# Patient Record
Sex: MALE | Race: WHITE | NOT HISPANIC OR LATINO | URBAN - METROPOLITAN AREA
[De-identification: names, ages, dates, MRNs, and addresses within clinical notes are randomized per-mention and may not be internally consistent; named-entity substitution may affect disease eponyms.]

---

## 2020-11-09 ENCOUNTER — HOSPITAL ENCOUNTER (EMERGENCY)
Facility: HOSPITAL | Age: 60
Discharge: HOME | End: 2020-11-09
Attending: EMERGENCY MEDICINE | Admitting: EMERGENCY MEDICINE

## 2020-11-09 ENCOUNTER — APPOINTMENT (OUTPATIENT)
Dept: GENERAL RADIOLOGY | Facility: HOSPITAL | Age: 60
End: 2020-11-09

## 2020-11-09 VITALS
DIASTOLIC BLOOD PRESSURE: 95 MMHG | HEIGHT: 68 IN | SYSTOLIC BLOOD PRESSURE: 123 MMHG | BODY MASS INDEX: 40.16 KG/M2 | OXYGEN SATURATION: 95 % | TEMPERATURE: 97.9 F | WEIGHT: 265 LBS | RESPIRATION RATE: 18 BRPM | HEART RATE: 92 BPM

## 2020-11-09 DIAGNOSIS — I48.91 NEW ONSET ATRIAL FIBRILLATION (HCC): Primary | ICD-10-CM

## 2020-11-09 LAB
ALBUMIN SERPL-MCNC: 4.4 G/DL (ref 3.5–5.2)
ALBUMIN/GLOB SERPL: 1.8 G/DL
ALP SERPL-CCNC: 87 U/L (ref 39–117)
ALT SERPL W P-5'-P-CCNC: 27 U/L (ref 1–41)
ANION GAP SERPL CALCULATED.3IONS-SCNC: 7.7 MMOL/L (ref 5–15)
AST SERPL-CCNC: 23 U/L (ref 1–40)
BASOPHILS # BLD AUTO: 0.11 10*3/MM3 (ref 0–0.2)
BASOPHILS NFR BLD AUTO: 0.7 % (ref 0–1.5)
BILIRUB SERPL-MCNC: 0.4 MG/DL (ref 0–1.2)
BILIRUB UR QL STRIP: NEGATIVE
BUN SERPL-MCNC: 19 MG/DL (ref 6–20)
BUN/CREAT SERPL: 22.6 (ref 7–25)
CALCIUM SPEC-SCNC: 9.1 MG/DL (ref 8.6–10.5)
CHLORIDE SERPL-SCNC: 103 MMOL/L (ref 98–107)
CLARITY UR: CLEAR
CO2 SERPL-SCNC: 25.3 MMOL/L (ref 22–29)
COLOR UR: YELLOW
CREAT SERPL-MCNC: 0.84 MG/DL (ref 0.76–1.27)
DEPRECATED RDW RBC AUTO: 39.7 FL (ref 37–54)
EOSINOPHIL # BLD AUTO: 0.22 10*3/MM3 (ref 0–0.4)
EOSINOPHIL NFR BLD AUTO: 1.5 % (ref 0.3–6.2)
ERYTHROCYTE [DISTWIDTH] IN BLOOD BY AUTOMATED COUNT: 12.4 % (ref 12.3–15.4)
GFR SERPL CREATININE-BSD FRML MDRD: 113 ML/MIN/1.73
GFR SERPL CREATININE-BSD FRML MDRD: 94 ML/MIN/1.73
GLOBULIN UR ELPH-MCNC: 2.4 GM/DL
GLUCOSE SERPL-MCNC: 114 MG/DL (ref 65–99)
GLUCOSE UR STRIP-MCNC: NEGATIVE MG/DL
HCT VFR BLD AUTO: 49.3 % (ref 37.5–51)
HGB BLD-MCNC: 17.7 G/DL (ref 13–17.7)
HGB UR QL STRIP.AUTO: NEGATIVE
HOLD SPECIMEN: NORMAL
HOLD SPECIMEN: NORMAL
IMM GRANULOCYTES # BLD AUTO: 0.17 10*3/MM3 (ref 0–0.05)
IMM GRANULOCYTES NFR BLD AUTO: 1.1 % (ref 0–0.5)
INR PPP: 0.91 (ref 0.9–1.1)
KETONES UR QL STRIP: NEGATIVE
LEUKOCYTE ESTERASE UR QL STRIP.AUTO: NEGATIVE
LIPASE SERPL-CCNC: 18 U/L (ref 13–60)
LYMPHOCYTES # BLD AUTO: 2.58 10*3/MM3 (ref 0.7–3.1)
LYMPHOCYTES NFR BLD AUTO: 17.1 % (ref 19.6–45.3)
MCH RBC QN AUTO: 31.6 PG (ref 26.6–33)
MCHC RBC AUTO-ENTMCNC: 35.9 G/DL (ref 31.5–35.7)
MCV RBC AUTO: 88 FL (ref 79–97)
MONOCYTES # BLD AUTO: 1.14 10*3/MM3 (ref 0.1–0.9)
MONOCYTES NFR BLD AUTO: 7.6 % (ref 5–12)
NEUTROPHILS NFR BLD AUTO: 10.83 10*3/MM3 (ref 1.7–7)
NEUTROPHILS NFR BLD AUTO: 72 % (ref 42.7–76)
NITRITE UR QL STRIP: NEGATIVE
NRBC BLD AUTO-RTO: 0 /100 WBC (ref 0–0.2)
NT-PROBNP SERPL-MCNC: 49.5 PG/ML (ref 0–900)
PH UR STRIP.AUTO: 6 [PH] (ref 5–8)
PLATELET # BLD AUTO: 225 10*3/MM3 (ref 140–450)
PMV BLD AUTO: 9.4 FL (ref 6–12)
POTASSIUM SERPL-SCNC: 3.8 MMOL/L (ref 3.5–5.2)
PROT SERPL-MCNC: 6.8 G/DL (ref 6–8.5)
PROT UR QL STRIP: NEGATIVE
PROTHROMBIN TIME: 12.2 SECONDS (ref 11.7–14.2)
QT INTERVAL: 288 MS
RBC # BLD AUTO: 5.6 10*6/MM3 (ref 4.14–5.8)
SODIUM SERPL-SCNC: 136 MMOL/L (ref 136–145)
SP GR UR STRIP: 1.01 (ref 1–1.03)
TROPONIN T SERPL-MCNC: <0.01 NG/ML (ref 0–0.03)
UROBILINOGEN UR QL STRIP: NORMAL
WBC # BLD AUTO: 15.05 10*3/MM3 (ref 3.4–10.8)
WHOLE BLOOD HOLD SPECIMEN: NORMAL
WHOLE BLOOD HOLD SPECIMEN: NORMAL

## 2020-11-09 PROCEDURE — 99284 EMERGENCY DEPT VISIT MOD MDM: CPT

## 2020-11-09 PROCEDURE — 85025 COMPLETE CBC W/AUTO DIFF WBC: CPT | Performed by: NURSE PRACTITIONER

## 2020-11-09 PROCEDURE — 96374 THER/PROPH/DIAG INJ IV PUSH: CPT

## 2020-11-09 PROCEDURE — 83880 ASSAY OF NATRIURETIC PEPTIDE: CPT | Performed by: NURSE PRACTITIONER

## 2020-11-09 PROCEDURE — 81003 URINALYSIS AUTO W/O SCOPE: CPT | Performed by: NURSE PRACTITIONER

## 2020-11-09 PROCEDURE — 96376 TX/PRO/DX INJ SAME DRUG ADON: CPT

## 2020-11-09 PROCEDURE — 93005 ELECTROCARDIOGRAM TRACING: CPT | Performed by: EMERGENCY MEDICINE

## 2020-11-09 PROCEDURE — 93010 ELECTROCARDIOGRAM REPORT: CPT | Performed by: INTERNAL MEDICINE

## 2020-11-09 PROCEDURE — 84484 ASSAY OF TROPONIN QUANT: CPT | Performed by: NURSE PRACTITIONER

## 2020-11-09 PROCEDURE — 85610 PROTHROMBIN TIME: CPT | Performed by: NURSE PRACTITIONER

## 2020-11-09 PROCEDURE — 71045 X-RAY EXAM CHEST 1 VIEW: CPT

## 2020-11-09 PROCEDURE — 93005 ELECTROCARDIOGRAM TRACING: CPT

## 2020-11-09 PROCEDURE — 80053 COMPREHEN METABOLIC PANEL: CPT | Performed by: NURSE PRACTITIONER

## 2020-11-09 PROCEDURE — 93005 ELECTROCARDIOGRAM TRACING: CPT | Performed by: NURSE PRACTITIONER

## 2020-11-09 PROCEDURE — 83690 ASSAY OF LIPASE: CPT | Performed by: NURSE PRACTITIONER

## 2020-11-09 RX ORDER — SODIUM CHLORIDE 0.9 % (FLUSH) 0.9 %
10 SYRINGE (ML) INJECTION AS NEEDED
Status: DISCONTINUED | OUTPATIENT
Start: 2020-11-09 | End: 2020-11-10 | Stop reason: HOSPADM

## 2020-11-09 RX ADMIN — METOPROLOL TARTRATE 25 MG: 25 TABLET, FILM COATED ORAL at 21:38

## 2020-11-09 RX ADMIN — SODIUM CHLORIDE 500 ML: 9 INJECTION, SOLUTION INTRAVENOUS at 18:58

## 2020-11-09 RX ADMIN — METOROPROLOL TARTRATE 5 MG: 5 INJECTION, SOLUTION INTRAVENOUS at 19:01

## 2020-11-09 RX ADMIN — METOROPROLOL TARTRATE 5 MG: 5 INJECTION, SOLUTION INTRAVENOUS at 19:48

## 2020-11-09 RX ADMIN — APIXABAN 5 MG: 5 TABLET, FILM COATED ORAL at 21:38

## 2020-11-09 RX ADMIN — METOROPROLOL TARTRATE 5 MG: 5 INJECTION, SOLUTION INTRAVENOUS at 19:39

## 2020-11-09 NOTE — ED NOTES
"Pt reports palpitations. Wife did \"a 1 lead EKG on apple watch which showed afib with RVR.\" Pt denies chest pain or SOB. No previous history.      Josie Rodríguez, RN  11/09/20 6529    "

## 2020-11-10 LAB — QT INTERVAL: 346 MS

## 2020-11-10 NOTE — ED PROVIDER NOTES
I have supervised the care provided by the midlevel provider.    We have discussed this patient's history, physical exam, and treatment plan.   I have reviewed the note and have personally examined the patient and agree with the plan of care.  See attached attending note.  My personal findings are below:    Patient complains of palpitations and rapid heart rate that began this afternoon.  Denies chest pain, dizziness, shortness of breath, or syncope.  Nothing makes it better or worse.    On exam: Awake, alert.  Heart is irregularly irregular.  Rate is normal.  Lungs are clear bilaterally.  Abdomen is soft and nontender.  No calf tenderness.  No pedal edema.    I reviewed the patient's EKG and agree with the interpretation done by MASSIMO Hernandez.  EKG shows A. fib with RVR.  Patient denies any history of atrial fibrillation.  Labs are unremarkable except for white blood cell count of 15.  Chest x-ray shows mild cardiomegaly.  Patient has been given IV Lopressor.  Plan is to consult cardiology.     Harlan Gil MD  11/09/20 1952

## 2020-11-10 NOTE — ED NOTES
2nd dose of Lopressor IV given @1939  Afib on monitor      Kayla Casas RN  11/09/20 1940       Kayla Casas RN  11/09/20 1941

## 2020-11-10 NOTE — DISCHARGE INSTRUCTIONS
Although you are being discharged from the ED today, I encourage you to return for worsening symptoms. Things can, and do, change such that treatment at home with medication may not be adequate. Specifically I recommend returning for chest pain or discomfort, difficulty breathing, persistent vomiting or difficulty holding down liquids or medications, fever > 102.0 F, or any other worsening or alarming symptoms.     Rest. Drink plenty of fluids.  Follow up with PCP or provider listed for further evaluation and management.  Follow up with primary care provider for further management and to have blood pressure rechecked.  Take all medications as prescribed.

## 2020-11-12 ENCOUNTER — OFFICE VISIT (OUTPATIENT)
Dept: CARDIOLOGY | Facility: CLINIC | Age: 60
End: 2020-11-12

## 2020-11-12 VITALS
HEIGHT: 68 IN | WEIGHT: 268 LBS | BODY MASS INDEX: 40.62 KG/M2 | DIASTOLIC BLOOD PRESSURE: 78 MMHG | HEART RATE: 69 BPM | SYSTOLIC BLOOD PRESSURE: 132 MMHG

## 2020-11-12 DIAGNOSIS — R06.81 APNEA: ICD-10-CM

## 2020-11-12 DIAGNOSIS — I10 ESSENTIAL HYPERTENSION: ICD-10-CM

## 2020-11-12 DIAGNOSIS — I48.0 PAROXYSMAL ATRIAL FIBRILLATION (HCC): Primary | ICD-10-CM

## 2020-11-12 DIAGNOSIS — R07.89 CHEST DISCOMFORT: ICD-10-CM

## 2020-11-12 DIAGNOSIS — R06.83 SNORING: ICD-10-CM

## 2020-11-12 DIAGNOSIS — R60.9 EDEMA, UNSPECIFIED TYPE: ICD-10-CM

## 2020-11-12 PROCEDURE — 93000 ELECTROCARDIOGRAM COMPLETE: CPT | Performed by: INTERNAL MEDICINE

## 2020-11-12 PROCEDURE — 99204 OFFICE O/P NEW MOD 45 MIN: CPT | Performed by: INTERNAL MEDICINE

## 2020-11-12 RX ORDER — METOPROLOL TARTRATE 50 MG/1
50 TABLET, FILM COATED ORAL 2 TIMES DAILY
Qty: 180 TABLET | Refills: 3 | Status: SHIPPED | OUTPATIENT
Start: 2020-11-12 | End: 2020-11-12 | Stop reason: SDUPTHER

## 2020-11-12 RX ORDER — INDOMETHACIN 50 MG/1
50 CAPSULE ORAL 3 TIMES DAILY
COMMUNITY
Start: 2020-11-09 | End: 2020-11-12

## 2020-11-12 RX ORDER — FAMOTIDINE 40 MG/1
40 TABLET, FILM COATED ORAL DAILY PRN
COMMUNITY
Start: 2020-11-09

## 2020-11-12 RX ORDER — AMLODIPINE BESYLATE AND BENAZEPRIL HYDROCHLORIDE 10; 20 MG/1; MG/1
1 CAPSULE ORAL DAILY
COMMUNITY
Start: 2020-09-30 | End: 2020-11-25

## 2020-11-12 RX ORDER — METAXALONE 800 MG/1
800 TABLET ORAL
COMMUNITY
Start: 2020-09-08

## 2020-11-12 RX ORDER — METOPROLOL TARTRATE 50 MG/1
50 TABLET, FILM COATED ORAL 2 TIMES DAILY
Qty: 60 TABLET | Refills: 6 | Status: SHIPPED | OUTPATIENT
Start: 2020-11-12 | End: 2020-11-12 | Stop reason: SDUPTHER

## 2020-11-12 RX ORDER — CELECOXIB 200 MG/1
200 CAPSULE ORAL AS NEEDED
COMMUNITY
End: 2020-11-12

## 2020-11-12 RX ORDER — COLCHICINE 0.6 MG/1
1.2 TABLET ORAL AS NEEDED
COMMUNITY
Start: 2020-05-11 | End: 2021-05-11

## 2020-11-12 RX ORDER — HYDROCORTISONE BUTYRATE 1 MG/G
OINTMENT TOPICAL 2 TIMES DAILY PRN
COMMUNITY

## 2020-11-12 RX ORDER — FLUTICASONE PROPIONATE 50 MCG
1 SPRAY, SUSPENSION (ML) NASAL DAILY
COMMUNITY

## 2020-11-12 RX ORDER — HYDROCHLOROTHIAZIDE 12.5 MG/1
12.5 CAPSULE, GELATIN COATED ORAL DAILY
COMMUNITY
Start: 2020-10-13 | End: 2022-04-25

## 2020-11-12 RX ORDER — MONTELUKAST SODIUM 10 MG/1
10 TABLET ORAL DAILY
COMMUNITY
Start: 2020-09-08

## 2020-11-12 RX ORDER — DIPHENHYDRAMINE HCL 25 MG
25 CAPSULE ORAL DAILY
COMMUNITY

## 2020-11-12 RX ORDER — CHLORPHENIRAMINE MALEATE 4 MG/1
4 TABLET ORAL EVERY 6 HOURS PRN
COMMUNITY

## 2020-11-12 RX ORDER — METOPROLOL TARTRATE 50 MG/1
50 TABLET, FILM COATED ORAL 2 TIMES DAILY
Qty: 180 TABLET | Refills: 3 | Status: SHIPPED | OUTPATIENT
Start: 2020-11-12 | End: 2020-11-25 | Stop reason: SDUPTHER

## 2020-11-12 RX ORDER — OMEGA-3-ACID ETHYL ESTERS 1 G/1
CAPSULE, LIQUID FILLED ORAL
COMMUNITY
Start: 2020-09-05

## 2020-11-12 RX ORDER — VARDENAFIL HYDROCHLORIDE 20 MG/1
20 TABLET ORAL DAILY PRN
COMMUNITY

## 2020-11-12 NOTE — PROGRESS NOTES
Date of Office Visit: 2020  Encounter Provider: Ange Hernandez MD  Place of Service: [facility] CARDIOLOGY  Patient Name: Danyel Robertson  :1960    Chief complaint  Consult quested by the [address] [place] [address] emergency room for evaluation of atrial fibrillation    History of Present Illness  Patient is a 60-year-old M with history of hypertension, GFX disease and seasonal allergies.  He presented to [facility] on  (2 days ago (with rapid heartbeat palpitations that started around 1:30 in the afternoon.  Had an associated fluttering and anxiety in his chest.  His wife's apple watch indicated atrial fibrillation.  Apparently he has been having intermittent episodes for a year that may last for several hours in duration.  It is precipitated with anxiety and he has had significant stresses with political issues of late.  Emergency room EKG showed atrial fibrillation with rates of 130 that decreased to the 80s with IV Lopressor chest x-ray was unremarkable.  Blood work unremarkable.  Oral beta-blocker therapy and Eliquis were added.  Troponin was negative, proBNP TSH was normal.    He consumes 2 alcoholic beverages a day.  He was at night he believes he has apneic episodes.  He is fairly limited in his activities by knee pain.  He had been using Celebrex intermittently and Indocin daily.  He has been instructed to stop there.    Past Medical History:   Diagnosis Date   • Arthritis    • GERD (gastroesophageal reflux disease)    • Hypertension    • New onset atrial fibrillation (CMS/HCC) 2020   • Seasonal allergies      Past Surgical History:   Procedure Laterality Date   • KNEE ARTHROSCOPY W/ MENISCAL REPAIR Left 10/2011   • WISDOM TOOTH EXTRACTION       Outpatient Medications Prior to Visit   Medication Sig Dispense Refill   • amLODIPine-benazepril (LOTREL) 10-20 MG per capsule Take 1 capsule by mouth Daily.     • chlorpheniramine (CHLOR-TRIMETON) 4 MG tablet Take  4 mg by mouth Every 6 (Six) Hours As Needed for Allergies.     • colchicine 0.6 MG tablet Take 1.2 mg by mouth As Needed.     • diphenhydrAMINE (BENADRYL) 25 mg capsule Take 25 mg by mouth Daily.     • famotidine (PEPCID) 40 MG tablet Take 40 mg by mouth Daily.     • fluticasone (FLONASE) 50 MCG/ACT nasal spray 1 spray into the nostril(s) as directed by provider Daily.     • GUAIFENESIN PO Take  by mouth.     • hydroCHLOROthiazide (MICROZIDE) 12.5 MG capsule Take 12.5 mg by mouth Daily.     • hydrocortisone butyrate (LOCOID) 0.1 % ointment ointment Apply  topically to the appropriate area as directed Every 12 (Twelve) Hours.     • metaxalone (SKELAXIN) 800 MG tablet Take 800 mg by mouth 3 (Three) Times a Day.     • montelukast (SINGULAIR) 10 MG tablet Take 10 mg by mouth Daily.     • omega-3 acid ethyl esters (LOVAZA) 1 g capsule TAKE 2 CAPSULES BY MOUTH 2 (TWO) TIMES DAILY.     • vardenafil (LEVITRA) 20 MG tablet Take 20 mg by mouth Daily As Needed for Erectile Dysfunction.     • apixaban (ELIQUIS) 5 MG tablet tablet Take 1 tablet by mouth 2 (Two) Times a Day. 60 tablet 0   • celecoxib (CeleBREX) 200 MG capsule Take 200 mg by mouth As Needed for Mild Pain .     • indomethacin (INDOCIN) 50 MG capsule Take 50 mg by mouth 3 (Three) Times a Day.     • metoprolol tartrate (LOPRESSOR) 25 MG tablet Take 1 tablet by mouth 2 (Two) Times a Day. 30 tablet 0   • Omega-3 Fatty Acids (OMEGA 3 PO) Take  by mouth.       No facility-administered medications prior to visit.        Allergies as of 11/12/2020 - Reviewed 11/12/2020   Allergen Reaction Noted   • Procaine Itching and Swelling 09/30/2014   • Oxycodone Rash 09/30/2014   • Penicillins Rash 09/30/2014     Social History     Socioeconomic History   • Marital status:      Spouse name: Not on file   • Number of children: Not on file   • Years of education: Not on file   • Highest education level: Not on file   Tobacco Use   • Smoking status: Never Smoker   • Smokeless  "tobacco: Never Used   Substance and Sexual Activity   • Alcohol use: Yes     Comment: social   • Drug use: Never   • Sexual activity: Yes     Partners: Female     Family History   Problem Relation Age of Onset   • Heart disease Mother         VSD   • Other Father         shy drager syndrome   • Diabetes Sister      Review of Systems   Constitution: Negative for fever, malaise/fatigue, weight gain and weight loss.   HENT: Negative for ear pain, hearing loss, nosebleeds and sore throat.    Eyes: Negative for double vision, pain, vision loss in left eye and vision loss in right eye.   Cardiovascular: Positive for leg swelling and palpitations.        See history of present illness.   Respiratory: Positive for shortness of breath and snoring. Negative for cough, sleep disturbances due to breathing and wheezing.    Endocrine: Negative for cold intolerance, heat intolerance and polyuria.   Skin: Negative for itching, poor wound healing and rash.   Musculoskeletal: Positive for joint pain and joint swelling. Negative for myalgias.   Gastrointestinal: Negative for abdominal pain, diarrhea, hematochezia, nausea and vomiting.   Genitourinary: Negative for hematuria and hesitancy.   Neurological: Negative for numbness, paresthesias and seizures.   Psychiatric/Behavioral: Negative for depression. The patient is not nervous/anxious.         Objective:     Vitals:    11/12/20 1030 11/12/20 1031   BP: 132/82 132/78   BP Location: Right arm Left arm   Pulse: 69    Weight: 122 kg (268 lb)    Height: 172.7 cm (68\")      Body mass index is 40.75 kg/m².    Vitals signs reviewed.   Constitutional:       Appearance: Well-developed.      Comments: Obese   Eyes:      General: No scleral icterus.        Right eye: No discharge.      Conjunctiva/sclera: Conjunctivae normal.      Pupils: Pupils are equal, round, and reactive to light.   HENT:      Head: Normocephalic.      Nose: Nose normal.   Neck:      Musculoskeletal: Normal range of " motion and neck supple.      Thyroid: No thyromegaly.      Vascular: No JVD.   Pulmonary:      Effort: Pulmonary effort is normal. No respiratory distress.      Breath sounds: Normal breath sounds. No wheezing. No rales.   Cardiovascular:      Normal rate. Regular rhythm. Normal S1. Normal S2.      Murmurs: There is no murmur.      No gallop.   Pulses:     Carotid: 2+ bilaterally.     Radial: 2+ bilaterally.     Femoral: 2+ bilaterally.     Popliteal: 0 bilaterally.     Dorsalis pedis: 0 bilaterally.     Posterior tibial: 0 on the right side.  Edema:     Peripheral edema present.     Pretibial: 2+ edema of the left pretibial area.     Ankle: 1+ edema of the left ankle and trace edema of the right ankle.  Abdominal:      General: Bowel sounds are normal. There is no distension.      Palpations: Abdomen is soft.      Tenderness: There is no abdominal tenderness. There is no rebound.   Musculoskeletal: Normal range of motion.         General: No tenderness.   Skin:     General: Skin is warm and dry.      Findings: No erythema or rash.   Neurological:      Mental Status: Alert and oriented to person, place, and time.   Psychiatric:         Behavior: Behavior normal.         Thought Content: Thought content normal.         Judgment: Judgment normal.       Lab Review:     ECG 12 Lead    Date/Time: 11/12/2020 10:39 AM  Performed by: Ange Hernandez MD  Authorized by: Aneg Hernandez MD   Comparison: compared with previous ECG   Comparison to previous ECG: This rhythm has replaced atrial fibrillation  Rhythm: sinus rhythm  Other findings: left atrial abnormality    Clinical impression: abnormal EKG          Assessment:       Diagnosis Plan   1. Paroxysmal atrial fibrillation (CMS/HCC)  ECG 12 Lead    Adult Transthoracic Echo Complete W/ Cont if Necessary Per Protocol    Stress Test With Myocardial Perfusion One Day   2. Snoring  Home Sleep Study   3. Apnea  Home Sleep Study   4. Chest discomfort  Stress Test With Myocardial  Perfusion One Day   5. Edema, unspecified type     6. Essential hypertension       Plan:       1.  Paroxysmal atrial fibrillation.  Currently in sinus rhythm.  Will increase metoprolol to 50 mg twice daily.  Continue with Eliquis.  I told him to avoid indomethacin and Celebrex for now.  We will check an echocardiogram.  Depending on these results as well as testing for sleep apnea can address how long he will need to stay on Eliquis.  He would like to come off so he can resume use of nonsteroidals.  With vague chest discomfort noted with tachycardia and multiple risk factors for coronary disease also check a Lexiscan cardiac stress test.  I also asked him to avoid stimulants and alcohol.  2.  Hypertension.  As above.  3.  Probable sleep apnea with snoring and apnea that patient has perceived.  We will check a home sleep study  4.  Edema.  Unsure the knee pain and arthritis is contributing but Lotrel/amlodipine also likely contributing.  As blood pressure improves with treatment of follow sleep apnea and further titration of other antihypertensives, hopefully can decrease the pain dose.  5.  Dyslipidemia.  Hopefully can increase exercise regimen also asked him to cut back on carbohydrates his triglycerides elevated.  6.  Obesity       Your medication list          Accurate as of November 12, 2020 11:59 PM. If you have any questions, ask your nurse or doctor.            CHANGE how you take these medications      Instructions Last Dose Given Next Dose Due   metoprolol tartrate 50 MG tablet  Commonly known as: LOPRESSOR  What changed:   · medication strength  · how much to take  Changed by: Ange Hernandez MD      Take 1 tablet by mouth 2 (Two) Times a Day.          CONTINUE taking these medications      Instructions Last Dose Given Next Dose Due   amLODIPine-benazepril 10-20 MG per capsule  Commonly known as: LOTREL      Take 1 capsule by mouth Daily.       apixaban 5 MG tablet tablet  Commonly known as: ELIQUIS      Take  1 tablet by mouth 2 (Two) Times a Day.       chlorpheniramine 4 MG tablet  Commonly known as: CHLOR-TRIMETON      Take 4 mg by mouth Every 6 (Six) Hours As Needed for Allergies.       colchicine 0.6 MG tablet      Take 1.2 mg by mouth As Needed.       diphenhydrAMINE 25 mg capsule  Commonly known as: BENADRYL      Take 25 mg by mouth Daily.       famotidine 40 MG tablet  Commonly known as: PEPCID      Take 40 mg by mouth Daily.       fluticasone 50 MCG/ACT nasal spray  Commonly known as: FLONASE      1 spray into the nostril(s) as directed by provider Daily.       GUAIFENESIN PO      Take  by mouth.       hydroCHLOROthiazide 12.5 MG capsule  Commonly known as: MICROZIDE      Take 12.5 mg by mouth Daily.       hydrocortisone butyrate 0.1 % ointment ointment  Commonly known as: LOCOID      Apply  topically to the appropriate area as directed Every 12 (Twelve) Hours.       metaxalone 800 MG tablet  Commonly known as: SKELAXIN      Take 800 mg by mouth 3 (Three) Times a Day.       montelukast 10 MG tablet  Commonly known as: SINGULAIR      Take 10 mg by mouth Daily.       omega-3 acid ethyl esters 1 g capsule  Commonly known as: LOVAZA      TAKE 2 CAPSULES BY MOUTH 2 (TWO) TIMES DAILY.       vardenafil 20 MG tablet  Commonly known as: LEVITRA      Take 20 mg by mouth Daily As Needed for Erectile Dysfunction.          STOP taking these medications    celecoxib 200 MG capsule  Commonly known as: CeleBREX  Stopped by: Ange Hernanedz MD        indomethacin 50 MG capsule  Commonly known as: INDOCIN  Stopped by: Ange Hernandez MD              Where to Get Your Medications      These medications were sent to [facility]/pharmacy #[id number] - [place], [place] - [address] - [phone]  - [phone] FX  [address] [address]    Phone: [phone]   · metoprolol tartrate 50 MG tablet     You can get these medications from any pharmacy    Bring a paper prescription for each of these  medications  · apixaban 5 MG tablet tablet         Patient is no longer taking -.  I corrected the med list to reflect this.  I did not stop these medications.    Dictated utilizing Dragon dictation

## 2020-11-15 PROBLEM — I48.0 PAROXYSMAL ATRIAL FIBRILLATION (HCC): Status: ACTIVE | Noted: 2020-11-15

## 2020-11-15 PROBLEM — I10 ESSENTIAL HYPERTENSION: Status: ACTIVE | Noted: 2020-11-15

## 2020-11-15 PROBLEM — R07.89 CHEST DISCOMFORT: Status: ACTIVE | Noted: 2020-11-15

## 2020-11-15 PROBLEM — R06.81 APNEA: Status: ACTIVE | Noted: 2020-11-15

## 2020-11-15 PROBLEM — R60.9 EDEMA: Status: ACTIVE | Noted: 2020-11-15

## 2020-11-15 PROBLEM — R06.83 SNORING: Status: ACTIVE | Noted: 2020-11-15

## 2020-11-24 ENCOUNTER — TELEPHONE (OUTPATIENT)
Dept: CARDIOLOGY | Facility: CLINIC | Age: 60
End: 2020-11-24

## 2020-11-25 RX ORDER — AMLODIPINE BESYLATE AND BENAZEPRIL HYDROCHLORIDE 5; 20 MG/1; MG/1
1 CAPSULE ORAL DAILY
Qty: 30 CAPSULE | Refills: 6 | Status: SHIPPED | OUTPATIENT
Start: 2020-11-25 | End: 2020-12-17

## 2020-11-25 RX ORDER — METOPROLOL TARTRATE 50 MG/1
75 TABLET, FILM COATED ORAL 2 TIMES DAILY
Qty: 270 TABLET | Refills: 3 | Status: SHIPPED | OUTPATIENT
Start: 2020-11-25 | End: 2020-12-17 | Stop reason: SDUPTHER

## 2020-11-25 NOTE — TELEPHONE ENCOUNTER
11/24/20  19:38 EST    I am the on-call provider this evening.  Patient's wife called the office stating that patient feels that he has flipped back into atrial fibrillation.  She states that she put him on her apple watch and did confirm that he is in atrial fibrillation.  Reported that heart rate was averaging between .  At time of phone call heart rate was controlled below 100.  Patient asymptomatic other than sensation of heart palpitations.  Recommended that if heart rate is sustained greater than 100 to take an additional dose of metoprolol tartrate 50 mg.  Advised patient that I would have someone from the office reach out to him tomorrow to see how he is doing and to possibly make further recommendations regarding his medication regimen.      JODI Anne  [facility]

## 2020-11-25 NOTE — TELEPHONE ENCOUNTER
Please let the patient know to increase metoprolol to 75 mg twice daily and change amlodipine/benazepril (Lotrel) to 5/20 instead of the 10/20 he is currently taking.  I sent in prescriptions for both.  Continue with Eliquis and see if they can move up the echocardiogram and stress test to next week

## 2020-11-25 NOTE — TELEPHONE ENCOUNTER
11/25 -- [facility] requesting a call back about moving up appointments.    Thank you  Prema SHIPMAN

## 2020-11-25 NOTE — TELEPHONE ENCOUNTER
Spoke with pt's wife (Kassy).  Verbalized understanding and wrote those down.  No other questions.      Scheduling-Can testing be moved up?

## 2020-12-07 NOTE — TELEPHONE ENCOUNTER
Next appt with Jessica: 2/15/2021  Last appt with Dr Hernandez: 11/12/2020    Last labs: 11/09/2020 (CMP and CBC)

## 2020-12-14 ENCOUNTER — HOSPITAL ENCOUNTER (OUTPATIENT)
Dept: CARDIOLOGY | Facility: HOSPITAL | Age: 60
Discharge: HOME | End: 2020-12-14

## 2020-12-14 VITALS
DIASTOLIC BLOOD PRESSURE: 86 MMHG | HEART RATE: 65 BPM | SYSTOLIC BLOOD PRESSURE: 144 MMHG | WEIGHT: 268 LBS | BODY MASS INDEX: 40.62 KG/M2 | HEIGHT: 68 IN

## 2020-12-14 DIAGNOSIS — I48.0 PAROXYSMAL ATRIAL FIBRILLATION (HCC): ICD-10-CM

## 2020-12-14 DIAGNOSIS — R07.89 CHEST DISCOMFORT: ICD-10-CM

## 2020-12-14 LAB
AORTIC ARCH: 3.2 CM
ASCENDING AORTA: 3.6 CM
BH CV ECHO MEAS - ACS: 2.5 CM
BH CV ECHO MEAS - AO MAX PG (FULL): 3.9 MMHG
BH CV ECHO MEAS - AO MAX PG: 8.1 MMHG
BH CV ECHO MEAS - AO MEAN PG (FULL): 1.5 MMHG
BH CV ECHO MEAS - AO MEAN PG: 3.9 MMHG
BH CV ECHO MEAS - AO ROOT AREA (BSA CORRECTED): 1.5
BH CV ECHO MEAS - AO ROOT AREA: 9.9 CM^2
BH CV ECHO MEAS - AO ROOT DIAM: 3.5 CM
BH CV ECHO MEAS - AO V2 MAX: 142.5 CM/SEC
BH CV ECHO MEAS - AO V2 MEAN: 91.5 CM/SEC
BH CV ECHO MEAS - AO V2 VTI: 30.7 CM
BH CV ECHO MEAS - ASC AORTA: 3.6 CM
BH CV ECHO MEAS - AVA(I,A): 2.5 CM^2
BH CV ECHO MEAS - AVA(I,D): 2.5 CM^2
BH CV ECHO MEAS - AVA(V,A): 2.4 CM^2
BH CV ECHO MEAS - AVA(V,D): 2.4 CM^2
BH CV ECHO MEAS - BSA(HAYCOCK): 2.5 M^2
BH CV ECHO MEAS - BSA: 2.3 M^2
BH CV ECHO MEAS - BZI_BMI: 40.3 KILOGRAMS/M^2
BH CV ECHO MEAS - BZI_METRIC_HEIGHT: 172.7 CM
BH CV ECHO MEAS - BZI_METRIC_WEIGHT: 120.2 KG
BH CV ECHO MEAS - EDV(MOD-SP2): 101 ML
BH CV ECHO MEAS - EDV(MOD-SP4): 111 ML
BH CV ECHO MEAS - EDV(TEICH): 78.3 ML
BH CV ECHO MEAS - EF(CUBED): 66 %
BH CV ECHO MEAS - EF(MOD-BP): 64 %
BH CV ECHO MEAS - EF(MOD-SP2): 61.4 %
BH CV ECHO MEAS - EF(MOD-SP4): 65.8 %
BH CV ECHO MEAS - EF(TEICH): 57.9 %
BH CV ECHO MEAS - ESV(MOD-SP2): 39 ML
BH CV ECHO MEAS - ESV(MOD-SP4): 38 ML
BH CV ECHO MEAS - ESV(TEICH): 32.9 ML
BH CV ECHO MEAS - FS: 30.2 %
BH CV ECHO MEAS - IVS/LVPW: 0.96
BH CV ECHO MEAS - IVSD: 1.2 CM
BH CV ECHO MEAS - LAT PEAK E' VEL: 10.4 CM/SEC
BH CV ECHO MEAS - LV DIASTOLIC VOL/BSA (35-75): 48.2 ML/M^2
BH CV ECHO MEAS - LV MASS(C)D: 181.9 GRAMS
BH CV ECHO MEAS - LV MASS(C)DI: 79 GRAMS/M^2
BH CV ECHO MEAS - LV MAX PG: 4.2 MMHG
BH CV ECHO MEAS - LV MEAN PG: 2.4 MMHG
BH CV ECHO MEAS - LV SYSTOLIC VOL/BSA (12-30): 16.5 ML/M^2
BH CV ECHO MEAS - LV V1 MAX: 102.8 CM/SEC
BH CV ECHO MEAS - LV V1 MEAN: 74.6 CM/SEC
BH CV ECHO MEAS - LV V1 VTI: 23.1 CM
BH CV ECHO MEAS - LVIDD: 4.2 CM
BH CV ECHO MEAS - LVIDS: 2.9 CM
BH CV ECHO MEAS - LVLD AP2: 8 CM
BH CV ECHO MEAS - LVLD AP4: 7.7 CM
BH CV ECHO MEAS - LVLS AP2: 7.7 CM
BH CV ECHO MEAS - LVLS AP4: 7.7 CM
BH CV ECHO MEAS - LVOT AREA (M): 3.5 CM^2
BH CV ECHO MEAS - LVOT AREA: 3.3 CM^2
BH CV ECHO MEAS - LVOT DIAM: 2.1 CM
BH CV ECHO MEAS - LVPWD: 1.2 CM
BH CV ECHO MEAS - MED PEAK E' VEL: 6.2 CM/SEC
BH CV ECHO MEAS - MR MAX PG: 30.4 MMHG
BH CV ECHO MEAS - MR MAX VEL: 275.5 CM/SEC
BH CV ECHO MEAS - MV A DUR: 0.15 SEC
BH CV ECHO MEAS - MV A MAX VEL: 87 CM/SEC
BH CV ECHO MEAS - MV DEC SLOPE: 208.1 CM/SEC^2
BH CV ECHO MEAS - MV DEC TIME: 0.2 SEC
BH CV ECHO MEAS - MV E MAX VEL: 88.7 CM/SEC
BH CV ECHO MEAS - MV E/A: 1
BH CV ECHO MEAS - MV MAX PG: 3.5 MMHG
BH CV ECHO MEAS - MV MEAN PG: 1.5 MMHG
BH CV ECHO MEAS - MV P1/2T MAX VEL: 93.7 CM/SEC
BH CV ECHO MEAS - MV P1/2T: 131.8 MSEC
BH CV ECHO MEAS - MV V2 MAX: 93.7 CM/SEC
BH CV ECHO MEAS - MV V2 MEAN: 56.9 CM/SEC
BH CV ECHO MEAS - MV V2 VTI: 37.6 CM
BH CV ECHO MEAS - MVA P1/2T LCG: 2.3 CM^2
BH CV ECHO MEAS - MVA(P1/2T): 1.7 CM^2
BH CV ECHO MEAS - MVA(VTI): 2 CM^2
BH CV ECHO MEAS - PA ACC TIME: 0.15 SEC
BH CV ECHO MEAS - PA MAX PG (FULL): 2.6 MMHG
BH CV ECHO MEAS - PA MAX PG: 3.9 MMHG
BH CV ECHO MEAS - PA PR(ACCEL): 10.1 MMHG
BH CV ECHO MEAS - PA V2 MAX: 99.1 CM/SEC
BH CV ECHO MEAS - PI END-D VEL: 104.5 CM/SEC
BH CV ECHO MEAS - PULM A REVS DUR: 0.09 SEC
BH CV ECHO MEAS - PULM A REVS VEL: 22.8 CM/SEC
BH CV ECHO MEAS - PULM DIAS VEL: 31.9 CM/SEC
BH CV ECHO MEAS - PULM S/D: 1.4
BH CV ECHO MEAS - PULM SYS VEL: 45.2 CM/SEC
BH CV ECHO MEAS - PVA(V,A): 2.8 CM^2
BH CV ECHO MEAS - PVA(V,D): 2.8 CM^2
BH CV ECHO MEAS - QP/QS: 1
BH CV ECHO MEAS - RAP SYSTOLE: 8 MMHG
BH CV ECHO MEAS - RV MAX PG: 1.4 MMHG
BH CV ECHO MEAS - RV MEAN PG: 0.81 MMHG
BH CV ECHO MEAS - RV V1 MAX: 58.3 CM/SEC
BH CV ECHO MEAS - RV V1 MEAN: 43.3 CM/SEC
BH CV ECHO MEAS - RV V1 VTI: 16.2 CM
BH CV ECHO MEAS - RVOT AREA: 4.8 CM^2
BH CV ECHO MEAS - RVOT DIAM: 2.5 CM
BH CV ECHO MEAS - SI(AO): 131.6 ML/M^2
BH CV ECHO MEAS - SI(CUBED): 21.2 ML/M^2
BH CV ECHO MEAS - SI(LVOT): 33.2 ML/M^2
BH CV ECHO MEAS - SI(MOD-SP2): 26.9 ML/M^2
BH CV ECHO MEAS - SI(MOD-SP4): 31.7 ML/M^2
BH CV ECHO MEAS - SI(TEICH): 19.7 ML/M^2
BH CV ECHO MEAS - SUP REN AO DIAM: 2.2 CM
BH CV ECHO MEAS - SV(AO): 303.2 ML
BH CV ECHO MEAS - SV(CUBED): 48.7 ML
BH CV ECHO MEAS - SV(LVOT): 76.5 ML
BH CV ECHO MEAS - SV(MOD-SP2): 62 ML
BH CV ECHO MEAS - SV(MOD-SP4): 73 ML
BH CV ECHO MEAS - SV(RVOT): 77.8 ML
BH CV ECHO MEAS - SV(TEICH): 45.4 ML
BH CV ECHO MEAS - TAPSE (>1.6): 2.1 CM
BH CV ECHO MEASUREMENTS AVERAGE E/E' RATIO: 10.69
BH CV NUCLEAR PRIOR STUDY: 2
BH CV STRESS BP STAGE 1: NORMAL
BH CV STRESS COMMENTS STAGE 1: NORMAL
BH CV STRESS DOSE REGADENOSON STAGE 1: 0.4
BH CV STRESS DURATION MIN STAGE 1: 0
BH CV STRESS DURATION SEC STAGE 1: 10
BH CV STRESS HR STAGE 1: 85
BH CV STRESS PROTOCOL 1: NORMAL
BH CV STRESS RECOVERY BP: NORMAL MMHG
BH CV STRESS RECOVERY HR: 66 BPM
BH CV STRESS STAGE 1: 1
BH CV XLRA - RV BASE: 3.2 CM
BH CV XLRA - RV LENGTH: 8.5 CM
BH CV XLRA - RV MID: 3.1 CM
BH CV XLRA - TDI S': 13.4 CM/SEC
LEFT ATRIUM VOLUME INDEX: 32 ML/M2
LV EF 2D ECHO EST: 64 %
LV EF NUC BP: 53 %
MAXIMAL PREDICTED HEART RATE: 161 BPM
MAXIMAL PREDICTED HEART RATE: 161 BPM
PERCENT MAX PREDICTED HR: 52.8 %
SINUS: 3.5 CM
STJ: 3.4 CM
STRESS BASELINE BP: NORMAL MMHG
STRESS BASELINE HR: 57 BPM
STRESS PERCENT HR: 62 %
STRESS POST EXERCISE DUR SEC: 10 SEC
STRESS POST PEAK BP: NORMAL MMHG
STRESS POST PEAK HR: 85 BPM
STRESS TARGET HR: 137 BPM
STRESS TARGET HR: 137 BPM

## 2020-12-14 PROCEDURE — 0 TECHNETIUM TETROFOSMIN KIT: Performed by: INTERNAL MEDICINE

## 2020-12-14 PROCEDURE — 93306 TTE W/DOPPLER COMPLETE: CPT | Performed by: INTERNAL MEDICINE

## 2020-12-14 PROCEDURE — 93017 CV STRESS TEST TRACING ONLY: CPT

## 2020-12-14 PROCEDURE — 93306 TTE W/DOPPLER COMPLETE: CPT

## 2020-12-14 PROCEDURE — 25010000002 REGADENOSON 0.4 MG/5ML SOLUTION: Performed by: INTERNAL MEDICINE

## 2020-12-14 PROCEDURE — 93016 CV STRESS TEST SUPVJ ONLY: CPT | Performed by: INTERNAL MEDICINE

## 2020-12-14 PROCEDURE — A9502 TC99M TETROFOSMIN: HCPCS | Performed by: INTERNAL MEDICINE

## 2020-12-14 PROCEDURE — 93018 CV STRESS TEST I&R ONLY: CPT | Performed by: INTERNAL MEDICINE

## 2020-12-14 PROCEDURE — 78452 HT MUSCLE IMAGE SPECT MULT: CPT | Performed by: INTERNAL MEDICINE

## 2020-12-14 PROCEDURE — 78452 HT MUSCLE IMAGE SPECT MULT: CPT

## 2020-12-14 RX ADMIN — TETROFOSMIN 1 DOSE: 1.38 INJECTION, POWDER, LYOPHILIZED, FOR SOLUTION INTRAVENOUS at 08:29

## 2020-12-14 RX ADMIN — REGADENOSON 0.4 MG: 0.08 INJECTION, SOLUTION INTRAVENOUS at 09:43

## 2020-12-14 RX ADMIN — TETROFOSMIN 1 DOSE: 1.38 INJECTION, POWDER, LYOPHILIZED, FOR SOLUTION INTRAVENOUS at 09:43

## 2020-12-15 ENCOUNTER — TELEPHONE (OUTPATIENT)
Dept: CARDIOLOGY | Facility: CLINIC | Age: 60
End: 2020-12-15

## 2020-12-15 NOTE — TELEPHONE ENCOUNTER
Stress test without evidence of ischemia, considered low risk.  Echo without significant findings.  LA volume was borderline increased.  Would like to ensure BP well controlled and no sleep apnea. It looks like he is scheduled for sleep study 1/18/2021, would recommend proceeding with this.  Keep February follow-up as scheduled or follow-up sooner for issues or concerns.  Discussed with patient's wife since patient not home, ok per verbal release form.  She believes BP may be high recently but will call or send readings later today or tomorrow AM so we can review and adjust medications as indicated. She will also have him call us if has has any additional questions or concerns.

## 2020-12-15 NOTE — TELEPHONE ENCOUNTER
Of note, patient's correct phone number is actually: [phone] and I have corrected this in the system.

## 2020-12-17 ENCOUNTER — PATIENT MESSAGE (OUTPATIENT)
Dept: CARDIOLOGY | Facility: CLINIC | Age: 60
End: 2020-12-17

## 2020-12-17 ENCOUNTER — TELEPHONE (OUTPATIENT)
Dept: CARDIOLOGY | Facility: CLINIC | Age: 60
End: 2020-12-17

## 2020-12-17 RX ORDER — AMLODIPINE BESYLATE AND BENAZEPRIL HYDROCHLORIDE 10; 20 MG/1; MG/1
1 CAPSULE ORAL DAILY
Qty: 90 CAPSULE | Refills: 1 | Status: SHIPPED | OUTPATIENT
Start: 2020-12-17 | End: 2021-07-06

## 2020-12-17 RX ORDER — METOPROLOL TARTRATE 50 MG/1
75 TABLET, FILM COATED ORAL 2 TIMES DAILY
Qty: 270 TABLET | Refills: 3 | Status: SHIPPED | OUTPATIENT
Start: 2020-12-17 | End: 2020-12-22 | Stop reason: SDUPTHER

## 2020-12-22 RX ORDER — METOPROLOL TARTRATE 75 MG/1
75 TABLET, FILM COATED ORAL 2 TIMES DAILY
Qty: 180 TABLET | Refills: 0 | Status: SHIPPED | OUTPATIENT
Start: 2020-12-22 | End: 2021-03-05

## 2021-01-18 ENCOUNTER — APPOINTMENT (OUTPATIENT)
Dept: SLEEP MEDICINE | Facility: HOSPITAL | Age: 61
End: 2021-01-18

## 2021-01-22 ENCOUNTER — HOSPITAL ENCOUNTER (OUTPATIENT)
Dept: SLEEP MEDICINE | Facility: HOSPITAL | Age: 61
Discharge: HOME | End: 2021-01-22
Admitting: INTERNAL MEDICINE

## 2021-01-22 PROCEDURE — 95806 SLEEP STUDY UNATT&RESP EFFT: CPT

## 2021-01-22 PROCEDURE — 95806 SLEEP STUDY UNATT&RESP EFFT: CPT | Performed by: INTERNAL MEDICINE

## 2021-02-01 ENCOUNTER — TELEPHONE (OUTPATIENT)
Dept: CARDIOLOGY | Facility: CLINIC | Age: 61
End: 2021-02-01

## 2021-02-01 DIAGNOSIS — G47.33 OSA (OBSTRUCTIVE SLEEP APNEA): Primary | ICD-10-CM

## 2021-02-01 DIAGNOSIS — G47.34 SLEEP RELATED HYPOXIA: ICD-10-CM

## 2021-02-01 NOTE — TELEPHONE ENCOUNTER
Dr. Hernandez is out of the office today.  Please let patient know that home sleep study does indicate at least moderate sleep apnea as well as some low oxygen levels while sleeping.  Highly recommend seeing sleep medicine to discuss possible treatment options.  I have placed a referral to sleep medicine, please have him let us know if he has not received appointment information within 1 week.  Please let me know if he has any additional questions or concerns at this time.  We keep February follow-up with me as scheduled or call sooner for issues or concerns.

## 2021-02-17 ENCOUNTER — APPOINTMENT (OUTPATIENT)
Dept: SLEEP MEDICINE | Facility: HOSPITAL | Age: 61
End: 2021-02-17

## 2021-03-05 RX ORDER — METOPROLOL TARTRATE 75 MG/1
75 TABLET, FILM COATED ORAL 2 TIMES DAILY
Qty: 180 TABLET | Refills: 0 | Status: SHIPPED | OUTPATIENT
Start: 2021-03-05 | End: 2021-04-23 | Stop reason: SDUPTHER

## 2021-03-22 ENCOUNTER — OFFICE VISIT (OUTPATIENT)
Dept: CARDIOLOGY | Facility: CLINIC | Age: 61
End: 2021-03-22

## 2021-03-22 VITALS
DIASTOLIC BLOOD PRESSURE: 80 MMHG | HEIGHT: 68 IN | HEART RATE: 81 BPM | WEIGHT: 271.4 LBS | BODY MASS INDEX: 41.13 KG/M2 | SYSTOLIC BLOOD PRESSURE: 120 MMHG

## 2021-03-22 DIAGNOSIS — Z79.01 CHRONIC ANTICOAGULATION: ICD-10-CM

## 2021-03-22 DIAGNOSIS — I10 ESSENTIAL HYPERTENSION: ICD-10-CM

## 2021-03-22 DIAGNOSIS — I48.0 PAROXYSMAL ATRIAL FIBRILLATION (HCC): Primary | ICD-10-CM

## 2021-03-22 DIAGNOSIS — G47.33 OSA (OBSTRUCTIVE SLEEP APNEA): ICD-10-CM

## 2021-03-22 PROCEDURE — 93000 ELECTROCARDIOGRAM COMPLETE: CPT | Performed by: NURSE PRACTITIONER

## 2021-03-22 PROCEDURE — 99214 OFFICE O/P EST MOD 30 MIN: CPT | Performed by: NURSE PRACTITIONER

## 2021-03-22 NOTE — PROGRESS NOTES
Date of Office Visit: 2021  Encounter Provider: Piedad Samson, ELAINE, APRN  Place of Service: [facility] CARDIOLOGY  Patient Name: Danyel Robertson  :1960        Subjective:     Chief Complaint:  Paroxysmal atrial fibrillation, hypertension, sleep apnea, obesity      History of Present Illness:  Danyel Robertson is a 60 y.o. male patient of Dr. Hernandez.  This is my first time seeing this patient in the office today and I reviewed his records.    Patient has a history of hypertension, atrial fibrillation, lower extremity edema, dyslipidemia, obesity, obstructive sleep apnea.    Patient was seen in [facility] ER 2020 after having 2 days of rapid heart rate and palpitations on and off.  He felt like an anxiety feeling in his chest.  His wife's apple watch showed atrial fibrillation.  He reported he been having intermittent episodes for over a year that would last several hours in duration.  Anxiety and stress can make them worse.  In the ER EKG showed atrial fibrillation with rapid rates of 130 which decreased to the 80s with IV Lopressor.  Chest x-ray was unremarkable.  Blood work was unremarkable.  He was started on oral beta-blocker therapy and Eliquis.  Troponin was negative, proBNP and TSH were normal.  He was seen in the office 2020 by Dr. Hernandez.  It was recommended to avoid NSAIDs.  Sleep apnea testing was recommended and did show at least moderate sleep apnea.  Appointment with sleep medicine was recommended.  Echo 2020 showed normal LV systolic function with EF of 64%, borderline increased left atrial volume, trace mitral regurgitation.  Stress test 2020 showed no evidence of ischemia, considered a low risk study.      Patient presents to office today for follow-up appointment.  Patient reports he continues to have episodes of atrial fibrillation maybe 2-3 times a week, sometimes lasting a couple of hours but sometimes lasting 12 hours.  His heart rate when he is  in normal sinus rhythm usually is 60s to 70s.  He reports that now that he thinks about it he has been noticing these palpitations occurring multiple times a week for years.  They are quite bothersome at times.  He is in atrial fibrillation in the office today with controlled rate.  Denies any chest pain or discomfort, shortness of breath, shortness of breath with exertion, dizziness or lightheadedness, syncope, near syncope, abnormal bleeding, or falls.  He has chronic fatigue with history of untreated sleep apnea and highly recommended following back up with sleep medicine to discuss treatment options.  He is not sure of the sleep study was a good study and did recommend discussing this with sleep medicine as well.  He has some chronic lower extremity swelling, left greater than right, which she attributes to history of knee issues.  Blood pressure at home running about the same as today.        Past Medical History:   Diagnosis Date   • Arthritis    • GERD (gastroesophageal reflux disease)    • Hypertension    • New onset atrial fibrillation (CMS/HCC) 11/09/2020   • Seasonal allergies      Past Surgical History:   Procedure Laterality Date   • KNEE ARTHROSCOPY W/ MENISCAL REPAIR Left 10/2011   • WISDOM TOOTH EXTRACTION       Outpatient Medications Prior to Visit   Medication Sig Dispense Refill   • amLODIPine-benazepril (Lotrel) 10-20 MG per capsule Take 1 capsule by mouth Daily. 90 capsule 1   • apixaban (ELIQUIS) 5 MG tablet tablet Take 1 tablet by mouth 2 (Two) Times a Day. 180 tablet 2   • chlorpheniramine (CHLOR-TRIMETON) 4 MG tablet Take 4 mg by mouth Every 6 (Six) Hours As Needed for Allergies.     • colchicine 0.6 MG tablet Take 1.2 mg by mouth As Needed.     • diphenhydrAMINE (BENADRYL) 25 mg capsule Take 25 mg by mouth Daily.     • famotidine (PEPCID) 40 MG tablet Take 40 mg by mouth Daily As Needed.     • fluticasone (FLONASE) 50 MCG/ACT nasal spray 1 spray into the nostril(s) as directed by provider  "Daily.     • GUAIFENESIN PO Take 40 mg by mouth Daily.     • hydroCHLOROthiazide (MICROZIDE) 12.5 MG capsule Take 12.5 mg by mouth Daily.     • hydrocortisone butyrate (LOCOID) 0.1 % ointment ointment Apply  topically to the appropriate area as directed 2 (Two) Times a Day As Needed.     • metaxalone (SKELAXIN) 800 MG tablet Take 800 mg by mouth 2 (two) times a day. Usually takes 1-2 times daily     • Metoprolol Tartrate 75 MG tablet TAKE 75 MG BY MOUTH 2 (TWO) TIMES A DAY. 180 tablet 0   • montelukast (SINGULAIR) 10 MG tablet Take 10 mg by mouth Daily.     • omega-3 acid ethyl esters (LOVAZA) 1 g capsule TAKE 2 CAPSULES BY MOUTH 2 (TWO) TIMES DAILY.     • vardenafil (LEVITRA) 20 MG tablet Take 20 mg by mouth Daily As Needed for Erectile Dysfunction.       No facility-administered medications prior to visit.       Allergies as of 03/22/2021 - Reviewed 03/22/2021   Allergen Reaction Noted   • Procaine Itching and Swelling 09/30/2014   • Oxycodone Rash 09/30/2014   • Penicillins Rash 09/30/2014     Social History     Socioeconomic History   • Marital status:      Spouse name: Not on file   • Number of children: Not on file   • Years of education: Not on file   • Highest education level: Not on file   Tobacco Use   • Smoking status: Never Smoker   • Smokeless tobacco: Never Used   Substance and Sexual Activity   • Alcohol use: Yes     Comment: social   • Drug use: Never   • Sexual activity: Yes     Partners: Female     Family History   Problem Relation Age of Onset   • Heart disease Mother         VSD   • Other Father         shy drager syndrome   • Diabetes Sister        Review of Systems   Constitutional: Negative for chills, fever, malaise/fatigue, weight gain and weight loss.   Cardiovascular: Positive for leg swelling (\"Left knee\"). Negative for chest pain, dyspnea on exertion, near-syncope, palpitations and syncope.        SEE HPI   Respiratory: Negative for cough, shortness of breath and wheezing.         " "RACH   Hematologic/Lymphatic: Negative for bleeding problem. Does not bruise/bleed easily.   Musculoskeletal: Positive for joint pain (Knee, left) and joint swelling (Knee, left). Negative for falls.   Gastrointestinal: Negative for diarrhea, melena, nausea and vomiting.   Genitourinary: Negative for hematuria.   Neurological: Negative for dizziness.   Psychiatric/Behavioral: Negative for altered mental status, depression and substance abuse. The patient is not nervous/anxious.           Objective:     Vitals:    03/22/21 1022   BP: 120/80   BP Location: Left arm   Cuff Size: Adult   Pulse: 81   Weight: 123 kg (271 lb 6.4 oz)   Height: 172.7 cm (68\")     Body mass index is 41.27 kg/m².      PHYSICAL EXAM:  Constitutional:       General: Not in acute distress.     Appearance: Well-developed. Not diaphoretic.      Comments: Obese   Eyes:      Pupils: Pupils are equal, round, and reactive to light.   HENT:      Head: Normocephalic and atraumatic.   Neck:      Vascular: No carotid bruit or JVD.   Pulmonary:      Effort: Pulmonary effort is normal. No respiratory distress.      Breath sounds: Normal breath sounds. No wheezing. No rales.   Cardiovascular:      Normal rate. Irregularly irregular rhythm.      Murmurs: There is no murmur.      No gallop. No click. No rub.   Pulses:     Intact distal pulses.   Edema:     Peripheral edema (Mild, nonpitting, left ankle) present.  Abdominal:      General: Bowel sounds are normal. There is no distension.      Palpations: Abdomen is soft.   Musculoskeletal: Normal range of motion.         General: No tenderness or deformity.      Cervical back: Neck supple. Skin:     General: Skin is warm and dry.      Findings: No erythema or rash.   Neurological:      Mental Status: Alert and oriented to person, place, and time.   Psychiatric:         Behavior: Behavior normal.         Judgment: Judgment normal.             ECG 12 Lead    Date/Time: 3/22/2021 11:00 AM  Performed by: Chapin" Piedad CAMACHO DNP, APRN  Authorized by: Piedad Samson, ELAINE, APRN   Comparison: compared with previous ECG from 11/9/2020  Rhythm: atrial fibrillation  Rate: normal  BPM: 84    Clinical impression: abnormal EKG  Comments: No significant changes from previous EKG              Assessment:       Diagnosis Plan   1. Paroxysmal atrial fibrillation (CMS/HCC)     2. Essential hypertension     3. RACH (obstructive sleep apnea)           Plan:     1. Paroxysmal atrial fibrillation: He is in rate controlled atrial fibrillation in the office today.  On metoprolol tartrate.  Anticoagulated with Eliquis.  He is getting multiple episodes of atrial fibrillation a week which are somewhat bothersome.  Heart rate while in sinus rhythm in 60s to 70s.  Did highly recommend avoiding stimulants and also really recommended working on his weight and treating sleep apnea as these can have an effect on the rhythm as well. Will increase metoprolol tartrate to 100 mg twice a day.  He will call with an update in a couple of weeks or sooner for heart rate less than 50, systolic blood pressure less than 110, or other issues or concerns.  If he continues to get frequent episodes of atrial fibrillation then he would like to see electrophysiology to discuss other treatment options.  Could also consider monitor study.  2. Chronic anticoagulation therapy: With Eliquis.  Denies falls or abnormal bleeding.  3. Hypertension: Blood pressure well controlled with current regimen.  Patient continue to monitor and call with high or low readings.  4. Obstructive sleep apnea: At least moderate severity suggested on 1/2021 Home sleep study.  Highly recommended calling and scheduling follow-up with Dr. Manzo to discuss possible treatment options.  Also recommended working on healthy weight loss.  Discussed in detail long-term risks of untreated sleep apnea.  5. Chronic lower extremity edema: Chronically left greater than right which she attributes to his  history of left knee issues and orthopedic issues on that side.  No pitting edema present.  Not new or worsening.  6. Left knee pain: Chronic, to schedule follow-up with orthopedic provider.  7. Dyslipidemia: Managed by outside provider  8. Obesity: Highly recommended healthy diet, exercise, lifestyle.  We discussed use of free version of my fitness pal seth.    Patient to follow-up with Dr. Hernandez in 6 months or sooner if needed for any new, recurrent, or worsening symptoms or other issues or concerns.  Discussed in detail signs/symptoms that warrant sooner call or follow-up to the office.  He will call with an update in approximately 2 weeks to let us know if palpitations have improved on increased dose of metoprolol tartrate and with healthy lifestyle changes.  If not then will refer to electrophysiology.        Records reviewed including but not limited to 1/2021 sleep study, 12/2020 echo, 12/2020 stress test         Your medication list          Accurate as of March 22, 2021 10:58 AM. If you have any questions, ask your nurse or doctor.            CONTINUE taking these medications      Instructions Last Dose Given Next Dose Due   amLODIPine-benazepril 10-20 MG per capsule  Commonly known as: Lotrel      Take 1 capsule by mouth Daily.       apixaban 5 MG tablet tablet  Commonly known as: ELIQUIS      Take 1 tablet by mouth 2 (Two) Times a Day.       chlorpheniramine 4 MG tablet  Commonly known as: CHLOR-TRIMETON      Take 4 mg by mouth Every 6 (Six) Hours As Needed for Allergies.       colchicine 0.6 MG tablet      Take 1.2 mg by mouth As Needed.       diphenhydrAMINE 25 mg capsule  Commonly known as: BENADRYL      Take 25 mg by mouth Daily.       famotidine 40 MG tablet  Commonly known as: PEPCID      Take 40 mg by mouth Daily As Needed.       fluticasone 50 MCG/ACT nasal spray  Commonly known as: FLONASE      1 spray into the nostril(s) as directed by provider Daily.       GUAIFENESIN PO      Take 40 mg by mouth  Daily.       hydroCHLOROthiazide 12.5 MG capsule  Commonly known as: MICROZIDE      Take 12.5 mg by mouth Daily.       hydrocortisone butyrate 0.1 % ointment ointment  Commonly known as: LOCOID      Apply  topically to the appropriate area as directed 2 (Two) Times a Day As Needed.       metaxalone 800 MG tablet  Commonly known as: SKELAXIN      Take 800 mg by mouth 2 (two) times a day. Usually takes 1-2 times daily       Metoprolol Tartrate 75 MG tablet      TAKE 75 MG BY MOUTH 2 (TWO) TIMES A DAY.       montelukast 10 MG tablet  Commonly known as: SINGULAIR      Take 10 mg by mouth Daily.       omega-3 acid ethyl esters 1 g capsule  Commonly known as: LOVAZA      TAKE 2 CAPSULES BY MOUTH 2 (TWO) TIMES DAILY.       vardenafil 20 MG tablet  Commonly known as: LEVITRA      Take 20 mg by mouth Daily As Needed for Erectile Dysfunction.              The above medication changes may not have been made by this provider.  Patient's medication list was updated to reflect medications they are currently taking including medication changes made by other providers.            Thanks,    Piedad Samson, ELAINE, APRN  03/22/2021         Dictated utilizing Dragon dictation

## 2021-03-24 ENCOUNTER — BULK ORDERING (OUTPATIENT)
Dept: CASE MANAGEMENT | Facility: OTHER | Age: 61
End: 2021-03-24

## 2021-03-24 DIAGNOSIS — Z23 IMMUNIZATION DUE: ICD-10-CM

## 2021-04-23 DIAGNOSIS — R00.2 PALPITATIONS: Primary | ICD-10-CM

## 2021-04-23 RX ORDER — METOPROLOL TARTRATE 100 MG/1
100 TABLET ORAL 2 TIMES DAILY
Qty: 180 TABLET | Refills: 1 | Status: SHIPPED | OUTPATIENT
Start: 2021-04-23 | End: 2021-10-18

## 2021-05-19 ENCOUNTER — TELEPHONE (OUTPATIENT)
Dept: CARDIOLOGY | Facility: CLINIC | Age: 61
End: 2021-05-19

## 2021-05-19 NOTE — TELEPHONE ENCOUNTER
Please let patient know that I received his blood pressure log and overall blood pressure appears well controlled.  Would call if staying greater than 130/80 on average or for systolic blood pressure readings less than 100.

## 2021-07-06 RX ORDER — AMLODIPINE BESYLATE AND BENAZEPRIL HYDROCHLORIDE 10; 20 MG/1; MG/1
CAPSULE ORAL
Qty: 90 CAPSULE | Refills: 1 | Status: SHIPPED | OUTPATIENT
Start: 2021-07-06 | End: 2021-12-17

## 2021-08-27 RX ORDER — APIXABAN 5 MG/1
TABLET, FILM COATED ORAL
Qty: 180 TABLET | Refills: 0 | Status: SHIPPED | OUTPATIENT
Start: 2021-08-27 | End: 2021-08-27 | Stop reason: SDUPTHER

## 2021-09-19 NOTE — TELEPHONE ENCOUNTER
----- Message from Danyel Robertson sent at 12/17/2020  9:42 AM EST -----  Regarding: Non-Urgent Medical Question  Contact: [phone]  Dr. Hernandez,  Since going to the 'Lotrel' 5/20 dose my blood pressure has been running high.  12/15/2020 - 158/99, pulse=58, 12/16/2020 - 153/92, pulse=60.  Should I go back to the 'Lotrel' 10/20 dose?    Also for the 'Metoprolol', my pharmacy never filled the 75 mg. order. So i have been cutting the 50s in half and taking 1 & 1/2 tablets.    Thanks,  Joey Robertson  
Sent via [employer].  (done)  
Stay on same dose of metoprolol (script resent) and increase lotrel to 10/20 mg a day  
Hourly Rounding

## 2021-09-24 ENCOUNTER — OFFICE VISIT (OUTPATIENT)
Dept: CARDIOLOGY | Facility: CLINIC | Age: 61
End: 2021-09-24

## 2021-09-24 VITALS
SYSTOLIC BLOOD PRESSURE: 134 MMHG | DIASTOLIC BLOOD PRESSURE: 76 MMHG | BODY MASS INDEX: 41.62 KG/M2 | WEIGHT: 274.6 LBS | HEART RATE: 58 BPM | HEIGHT: 68 IN

## 2021-09-24 DIAGNOSIS — Z79.01 CHRONIC ANTICOAGULATION: ICD-10-CM

## 2021-09-24 DIAGNOSIS — I48.0 PAROXYSMAL ATRIAL FIBRILLATION (HCC): ICD-10-CM

## 2021-09-24 DIAGNOSIS — I10 ESSENTIAL HYPERTENSION: Primary | ICD-10-CM

## 2021-09-24 PROCEDURE — 99214 OFFICE O/P EST MOD 30 MIN: CPT | Performed by: NURSE PRACTITIONER

## 2021-09-24 PROCEDURE — 93000 ELECTROCARDIOGRAM COMPLETE: CPT | Performed by: NURSE PRACTITIONER

## 2021-09-24 NOTE — PROGRESS NOTES
Date of Office Visit: 2021  Encounter Provider: Piedad Samson, ELAINE, APRN  Place of Service: [facility] CARDIOLOGY  Patient Name: Danyel Robertson  :1960        Subjective:     Chief Complaint:  Paroxysmal atrial fibrillation, chronic anticoagulation, hypertension      History of Present Illness:  Danyel Robertson is a 61 y.o. male patient of Dr. Hernandez.  I am seeing this patient in the office today and I reviewed his records.    Patient has a history of hypertension, atrial fibrillation, lower extremity edema, dyslipidemia, obesity, obstructive sleep apnea.     Patient was seen in [facility] ER 2020 after having 2 days of rapid heart rate and palpitations on and off.  He felt like an anxiety feeling in his chest.  His wife's apple watch showed atrial fibrillation.  He reported he been having intermittent episodes for over a year that would last several hours in duration.  Anxiety and stress can make them worse.  In the ER EKG showed atrial fibrillation with rapid rates of 130 which decreased to the 80s with IV Lopressor.  Chest x-ray was unremarkable.  Blood work was unremarkable.  He was started on oral beta-blocker therapy and Eliquis.  Troponin was negative, proBNP and TSH were normal.  He was seen in the office 2020 by Dr. Hernandez.  It was recommended to avoid NSAIDs.  Sleep apnea testing was recommended and did show at least moderate sleep apnea.  Appointment with sleep medicine was recommended.  Echo 2020 showed normal LV systolic function with EF of 64%, borderline increased left atrial volume, trace mitral regurgitation.  Stress test 2020 showed no evidence of ischemia, considered a low risk study.      Patient presents to office today for follow-up appointment.  Patient reports he is doing well since last visit.  He is feeling much improved on the 100 mg twice a day of the metoprolol, he feels that this is really helped with his A. fib/palpitations.  He  reports that sometimes he might get a couple brief episodes of palpitations in a week lasting no more than 2 minutes at a time but then other weeks he will have no palpitations at all.  Not symptomatic with the palpitations and is pleased with his A. fib control.  He feels like he notices palpitations when he is anxious or if BP is slightly high in the 130s over 80s, not even sure if this is A. fib or not.  Most the time blood pressure staying 120s over 70s and sometimes even getting readings in the 110 systolic.  He is not doing formal exercise but does do a lot of walking at work.  He does have some knee issues but feels he could ease back into his bicycle routine.  He denies any chest pain, shortness of breath, dizziness, syncope, near syncope, falls, fatigue, or abnormal bleeding.  He has some left knee swelling which he feels is orthopedic and follows with Ortho for this.  He had an injection of cortisone his knee recently and the swelling completely resolved with that.        Past Medical History:   Diagnosis Date   • Arthritis    • GERD (gastroesophageal reflux disease)    • Hypertension    • New onset atrial fibrillation (CMS/[facility]) 11/09/2020   • Seasonal allergies      Past Surgical History:   Procedure Laterality Date   • KNEE ARTHROSCOPY W/ MENISCAL REPAIR Left 10/2011   • WISDOM TOOTH EXTRACTION       Outpatient Medications Prior to Visit   Medication Sig Dispense Refill   • amLODIPine-benazepril (LOTREL) 10-20 MG per capsule TAKE 1 CAPSULE BY MOUTH EVERY DAY 90 capsule 1   • apixaban (Eliquis) 5 MG tablet tablet Take 1 tablet by mouth 2 (Two) Times a Day. 180 tablet 3   • chlorpheniramine (CHLOR-TRIMETON) 4 MG tablet Take 4 mg by mouth Every 6 (Six) Hours As Needed for Allergies.     • diphenhydrAMINE (BENADRYL) 25 mg capsule Take 25 mg by mouth Daily.     • famotidine (PEPCID) 40 MG tablet Take 40 mg by mouth Daily As Needed.     • fluticasone (FLONASE) 50 MCG/ACT nasal spray 1 spray into the nostril(s)  as directed by provider Daily.     • GUAIFENESIN PO Take 40 mg by mouth Daily.     • hydroCHLOROthiazide (MICROZIDE) 12.5 MG capsule Take 12.5 mg by mouth Daily.     • hydrocortisone butyrate (LOCOID) 0.1 % ointment ointment Apply  topically to the appropriate area as directed 2 (Two) Times a Day As Needed.     • metaxalone (SKELAXIN) 800 MG tablet Take 800 mg by mouth 2 (two) times a day. Usually takes 1-2 times daily     • metoprolol tartrate (LOPRESSOR) 100 MG tablet Take 1 tablet by mouth 2 (Two) Times a Day. 180 tablet 1   • montelukast (SINGULAIR) 10 MG tablet Take 10 mg by mouth Daily.     • omega-3 acid ethyl esters (LOVAZA) 1 g capsule TAKE 2 CAPSULES BY MOUTH 2 (TWO) TIMES DAILY.     • vardenafil (LEVITRA) 20 MG tablet Take 20 mg by mouth Daily As Needed for Erectile Dysfunction.       No facility-administered medications prior to visit.       Allergies as of 09/24/2021 - Reviewed 09/24/2021   Allergen Reaction Noted   • Procaine Itching and Swelling 09/30/2014   • Oxycodone Rash 09/30/2014   • Penicillins Rash 09/30/2014     Social History     Socioeconomic History   • Marital status:      Spouse name: Not on file   • Number of children: Not on file   • Years of education: Not on file   • Highest education level: Not on file   Tobacco Use   • Smoking status: Never Smoker   • Smokeless tobacco: Never Used   Substance and Sexual Activity   • Alcohol use: Yes     Comment: socialCaffeine use: None   • Drug use: Never   • Sexual activity: Yes     Partners: Female     Family History   Problem Relation Age of Onset   • Heart disease Mother         VSD   • Other Father         shy drager syndrome   • Diabetes Sister        Review of Systems   Constitutional: Negative for malaise/fatigue.   Cardiovascular:        SEE HPI   Respiratory: Negative for shortness of breath.    Hematologic/Lymphatic: Negative for bleeding problem.   Musculoskeletal: Negative for falls.   Gastrointestinal: Negative for melena.  "  Genitourinary: Negative for hematuria.   Neurological: Negative for dizziness.   Psychiatric/Behavioral: Negative for altered mental status.          Objective:     Vitals:    09/24/21 0900   BP: 134/76   BP Location: Left arm   Pulse: 58   Weight: 125 kg (274 lb 9.6 oz)   Height: 172.7 cm (68\")     Body mass index is 41.75 kg/m².      PHYSICAL EXAM:  Constitutional:       General: Not in acute distress.     Appearance: Well-developed. Obese. Not diaphoretic.   Eyes:      Pupils: Pupils are equal, round, and reactive to light.   HENT:      Head: Normocephalic and atraumatic.   Neck:      Vascular: No carotid bruit.   Pulmonary:      Effort: Pulmonary effort is normal. No respiratory distress.      Breath sounds: Normal breath sounds. No wheezing. No rales.   Cardiovascular:      Normal rate. Regular rhythm.      Murmurs: There is no murmur.      No gallop. No click. No rub.   Edema:     Peripheral edema absent.   Abdominal:      General: Bowel sounds are normal. There is no distension.      Palpations: Abdomen is soft.   Musculoskeletal: Normal range of motion.         General: No tenderness or deformity.      Cervical back: Neck supple. Skin:     General: Skin is warm and dry.      Findings: No erythema or rash.   Neurological:      Mental Status: Alert and oriented to person, place, and time.   Psychiatric:         Behavior: Behavior normal.         Judgment: Judgment normal.             ECG 12 Lead    Date/Time: 9/24/2021 9:30 AM  Performed by: Piedad Samson DNP, APRN  Authorized by: Piedad Samson DNP, JODI   Comparison: compared with previous ECG from 3/22/2021  Rhythm: sinus rhythm  BPM: 58  Comments: Patient no longer in atrial fibrillation, now in sinus rhythm.  Otherwise no significant changes from previous EKG.              Assessment:       Diagnosis Plan   1. Essential hypertension     2. Paroxysmal atrial fibrillation (CMS/HCC)     3. Chronic anticoagulation           Plan:     1. Paroxysmal " atrial fibrillation: He feels that his rhythm is much better controlled with the higher dose of the metoprolol tartrate.  He does continue to get some occasional palpitations no more than twice a week at most lasting 2 minutes.  Other weeks he does not have any palpitations.  We discussed possibly having him see EP but unclear whether these are actually rhythm related or just occurring when he is feeling anxious as they do occur when he has anxiety.  We discussed placing a Zio patch monitor however he declines.  He is planning on purchasing [employer] Mobile device and will send us a copy of the strips next time he has palpitations and we will see if he is having A. fib with this.  If so then we will plan on EP referral for further assistance with rhythm control.  Heart rate in the upper 50s in sinus rhythm today, completely asymptomatic, however do not likely have room to increase metoprolol or add diltiazem at this point.  2. On anticoagulation: Tolerating well without falls or abnormal bleeding.  3. Hypertension: Well controlled, stay mostly 120s over 70s.  He will continue to monitor and call staying greater than 130/80 on average.  4. Dyslipidemia: PCP managing  5. Obstructive sleep apnea  6. Left knee pain: Follows with Ortho  7. Obesity: He previously tolerated his bike routine well.  He is going to ease back and using his bike and increase as tolerated.  We also discussed possible elliptical or water aerobics if this is easier on his joints.    Patient to follow-up with Dr. Hernandez in 6 months or sooner if needed for any new, recurrent, or worsening symptoms or other issues or concerns.  Discussed in detail signs/symptoms that warrant sooner call or follow-up to the office or ER visit.        Records reviewed including but limited to 12/2020 stress test, 12/2020 echo, 3/2021 EKG.           Your medication list          Accurate as of September 24, 2021  9:49 AM. If you have any questions, ask your nurse or doctor.             CONTINUE taking these medications      Instructions Last Dose Given Next Dose Due   amLODIPine-benazepril 10-20 MG per capsule  Commonly known as: LOTREL      TAKE 1 CAPSULE BY MOUTH EVERY DAY       apixaban 5 MG tablet tablet  Commonly known as: Eliquis      Take 1 tablet by mouth 2 (Two) Times a Day.       chlorpheniramine 4 MG tablet  Commonly known as: CHLOR-TRIMETON      Take 4 mg by mouth Every 6 (Six) Hours As Needed for Allergies.       diphenhydrAMINE 25 mg capsule  Commonly known as: BENADRYL      Take 25 mg by mouth Daily.       famotidine 40 MG tablet  Commonly known as: PEPCID      Take 40 mg by mouth Daily As Needed.       fluticasone 50 MCG/ACT nasal spray  Commonly known as: FLONASE      1 spray into the nostril(s) as directed by provider Daily.       GUAIFENESIN PO      Take 40 mg by mouth Daily.       hydroCHLOROthiazide 12.5 MG capsule  Commonly known as: MICROZIDE      Take 12.5 mg by mouth Daily.       hydrocortisone butyrate 0.1 % ointment ointment  Commonly known as: LOCOID      Apply  topically to the appropriate area as directed 2 (Two) Times a Day As Needed.       metaxalone 800 MG tablet  Commonly known as: SKELAXIN      Take 800 mg by mouth 2 (two) times a day. Usually takes 1-2 times daily       metoprolol tartrate 100 MG tablet  Commonly known as: LOPRESSOR      Take 1 tablet by mouth 2 (Two) Times a Day.       montelukast 10 MG tablet  Commonly known as: SINGULAIR      Take 10 mg by mouth Daily.       omega-3 acid ethyl esters 1 g capsule  Commonly known as: LOVAZA      TAKE 2 CAPSULES BY MOUTH 2 (TWO) TIMES DAILY.       vardenafil 20 MG tablet  Commonly known as: LEVITRA      Take 20 mg by mouth Daily As Needed for Erectile Dysfunction.              The above medication changes may not have been made by this provider.  Patient's medication list was updated to reflect medications they are currently taking including medication changes made by other  providers.            Thanks,    Piedad Samson DNP, APRN  09/24/2021         Dictated utilizing Dragon dictation

## 2021-10-17 DIAGNOSIS — R00.2 PALPITATIONS: ICD-10-CM

## 2021-10-18 RX ORDER — METOPROLOL TARTRATE 100 MG/1
TABLET ORAL
Qty: 180 TABLET | Refills: 1 | Status: SHIPPED | OUTPATIENT
Start: 2021-10-18 | End: 2022-04-06

## 2021-11-30 NOTE — TELEPHONE ENCOUNTER
Was given 1 year supply in August.  Should not need refills.  Please confirm with patient/ pharmacy that they received.

## 2021-12-17 RX ORDER — AMLODIPINE BESYLATE AND BENAZEPRIL HYDROCHLORIDE 10; 20 MG/1; MG/1
CAPSULE ORAL
Qty: 90 CAPSULE | Refills: 1 | Status: SHIPPED | OUTPATIENT
Start: 2021-12-17 | End: 2022-06-16

## 2022-02-24 NOTE — TELEPHONE ENCOUNTER
LOV 9/24/21  Labs 5/10/21    I placed 2 boxes of Eliquis 5mg at the front for him to , along with a co-pay card.  I have also submitted a PA to [employer].    Lot # [id number] Exp 7/2024  Lot # [id number] Exp 4/2024

## 2022-04-05 DIAGNOSIS — R00.2 PALPITATIONS: ICD-10-CM

## 2022-04-06 RX ORDER — METOPROLOL TARTRATE 100 MG/1
TABLET ORAL
Qty: 180 TABLET | Refills: 1 | Status: SHIPPED | OUTPATIENT
Start: 2022-04-06 | End: 2022-10-08 | Stop reason: SDUPTHER

## 2022-04-25 ENCOUNTER — OFFICE VISIT (OUTPATIENT)
Dept: CARDIOLOGY | Facility: CLINIC | Age: 62
End: 2022-04-25

## 2022-04-25 VITALS
HEIGHT: 68 IN | BODY MASS INDEX: 42.44 KG/M2 | WEIGHT: 280 LBS | SYSTOLIC BLOOD PRESSURE: 110 MMHG | DIASTOLIC BLOOD PRESSURE: 70 MMHG | HEART RATE: 59 BPM

## 2022-04-25 DIAGNOSIS — I10 ESSENTIAL HYPERTENSION: ICD-10-CM

## 2022-04-25 DIAGNOSIS — I48.0 PAROXYSMAL ATRIAL FIBRILLATION: Primary | ICD-10-CM

## 2022-04-25 DIAGNOSIS — Z79.01 CHRONIC ANTICOAGULATION: ICD-10-CM

## 2022-04-25 DIAGNOSIS — G47.33 OSA (OBSTRUCTIVE SLEEP APNEA): ICD-10-CM

## 2022-04-25 DIAGNOSIS — R60.0 EDEMA LEG: ICD-10-CM

## 2022-04-25 PROCEDURE — 93000 ELECTROCARDIOGRAM COMPLETE: CPT | Performed by: INTERNAL MEDICINE

## 2022-04-25 PROCEDURE — 99214 OFFICE O/P EST MOD 30 MIN: CPT | Performed by: INTERNAL MEDICINE

## 2022-04-25 RX ORDER — FUROSEMIDE 20 MG/1
20 TABLET ORAL DAILY
COMMUNITY
Start: 2022-02-16

## 2022-06-16 RX ORDER — AMLODIPINE BESYLATE AND BENAZEPRIL HYDROCHLORIDE 10; 20 MG/1; MG/1
CAPSULE ORAL
Qty: 90 CAPSULE | Refills: 1 | Status: SHIPPED | OUTPATIENT
Start: 2022-06-16 | End: 2022-12-06 | Stop reason: SDUPTHER

## 2022-06-30 ENCOUNTER — TELEPHONE (OUTPATIENT)
Dept: CARDIOLOGY | Facility: CLINIC | Age: 62
End: 2022-06-30

## 2022-06-30 NOTE — TELEPHONE ENCOUNTER
Okay to switch to Xarelto 20 mg p.o. daily from Eliquis.  Continue please contact the patient and make him aware of this switch and while we are making it?

## 2022-06-30 NOTE — TELEPHONE ENCOUNTER
Danyel Robertson his insurance will not  cover Eliquis they are saying that it  is not medically necessary. The insurance is saying  that  they will cover Xarelto. Are you ok for Danyel to take Xarelto ?

## 2022-06-30 NOTE — TELEPHONE ENCOUNTER
Spoke with the pt. Pt states that he has 2 months worth of  Eliquis and that he has rx copay card  to use and that his insurance will start to cover Eliquis in July.I have  re -updated the medication list. Xarelto will not be needed. Amanda gibson

## 2022-07-22 RX ORDER — APIXABAN 5 MG/1
TABLET, FILM COATED ORAL
Qty: 180 TABLET | Refills: 3 | Status: SHIPPED | OUTPATIENT
Start: 2022-07-22

## 2022-10-08 DIAGNOSIS — R00.2 PALPITATIONS: ICD-10-CM

## 2022-10-10 RX ORDER — METOPROLOL TARTRATE 100 MG/1
100 TABLET ORAL 2 TIMES DAILY
Qty: 180 TABLET | Refills: 1 | Status: SHIPPED | OUTPATIENT
Start: 2022-10-10 | End: 2023-04-04

## 2022-10-31 ENCOUNTER — OFFICE VISIT (OUTPATIENT)
Dept: CARDIOLOGY | Facility: CLINIC | Age: 62
End: 2022-10-31

## 2022-10-31 VITALS
WEIGHT: 275.2 LBS | DIASTOLIC BLOOD PRESSURE: 84 MMHG | SYSTOLIC BLOOD PRESSURE: 144 MMHG | HEIGHT: 68 IN | BODY MASS INDEX: 41.71 KG/M2 | HEART RATE: 87 BPM

## 2022-10-31 DIAGNOSIS — Z79.01 CHRONIC ANTICOAGULATION: ICD-10-CM

## 2022-10-31 DIAGNOSIS — I10 ESSENTIAL HYPERTENSION: ICD-10-CM

## 2022-10-31 DIAGNOSIS — I48.0 PAROXYSMAL ATRIAL FIBRILLATION: Primary | ICD-10-CM

## 2022-10-31 PROCEDURE — 93000 ELECTROCARDIOGRAM COMPLETE: CPT | Performed by: NURSE PRACTITIONER

## 2022-10-31 PROCEDURE — 99214 OFFICE O/P EST MOD 30 MIN: CPT | Performed by: NURSE PRACTITIONER

## 2022-10-31 NOTE — PROGRESS NOTES
Date of Office Visit: 10/31/2022  Encounter Provider: Piedad Samson, ELAINE, APRN  Place of Service: [facility] CARDIOLOGY  Patient Name: Danyel Robertson  :1960        Subjective:     Chief Complaint:  Paroxysmal atrial fibrillation, chronic anticoagulation, hypertension      History of Present Illness:  Danyel Robertson is a 62 y.o. male patient of Dr. Hernandez.  I am seeing this patient in the office today and I reviewed his records    Patient has a history of hypertension, atrial fibrillation, lower extremity edema, dyslipidemia, obesity, obstructive sleep apnea.     Patient was seen in [facility] ER 2020 after having 2 days of rapid heart rate and palpitations on and off.  He felt like an anxiety feeling in his chest.  His wife's apple watch showed atrial fibrillation.  He reported he been having intermittent episodes for over a year that would last several hours in duration.  Anxiety and stress can make them worse.  In the ER EKG showed atrial fibrillation with rapid rates of 130 which decreased to the 80s with IV Lopressor.  Chest x-ray was unremarkable.  Blood work was unremarkable.  He was started on oral beta-blocker therapy and Eliquis.  Troponin was negative, proBNP and TSH were normal.  He was seen in the office 2020 by Dr. Hernandez.  It was recommended to avoid NSAIDs.  Sleep apnea testing was recommended and did show at least moderate sleep apnea.  Appointment with sleep medicine was recommended.  Echo 2020 showed normal LV systolic function with EF of 64%, borderline increased left atrial volume, trace mitral regurgitation.  Stress test 2020 showed no evidence of ischemia, considered a low risk study.      Patient presents to office today for follow-up appointment.  Patient reports he has been doing well overall since last visit.  He continues to get some occasional episodes of atrial fibrillation though feels that this is overall well controlled with his  metoprolol.  He does get increased fatigue when he is having an episode of atrial fibrillation.  He seems to notice an episode around big weather changes.  He stays active at work and is on his feet and denies exertional symptoms or concerns.  Denies chest pain or discomfort or dyspnea, denies dizziness, syncope, near syncope, falls, fatigue, or abnormal bleeding.  He continues to have some bilateral dependent edema to ankles/lower legs which resolves with elevation.  Compression socks help during the day when he is up on his feet.  Nothing new or worsening.  His blood pressure at home is typically 120s over 70s.  Unfortunately his 92-year-old mother is currently admitted to the hospital and likely reaching end-of-life, palliative care being considered.  This is understandably very stressful and he attributes this to his higher than normal blood pressure reading today.        Past Medical History:   Diagnosis Date   • Arthritis    • GERD (gastroesophageal reflux disease)    • Hypertension    • New onset atrial fibrillation (HCC) 11/09/2020   • Seasonal allergies      Past Surgical History:   Procedure Laterality Date   • KNEE ARTHROSCOPY W/ MENISCAL REPAIR Left 10/2011   • WISDOM TOOTH EXTRACTION       Outpatient Medications Prior to Visit   Medication Sig Dispense Refill   • amLODIPine-benazepril (LOTREL) 10-20 MG per capsule TAKE 1 CAPSULE BY MOUTH EVERY DAY 90 capsule 1   • chlorpheniramine (CHLOR-TRIMETON) 4 MG tablet Take 4 mg by mouth Every 6 (Six) Hours As Needed for Allergies.     • diphenhydrAMINE (BENADRYL) 25 mg capsule Take 25 mg by mouth Daily.     • Eliquis 5 MG tablet tablet TAKE 1 TABLET BY MOUTH TWICE A  tablet 3   • famotidine (PEPCID) 40 MG tablet Take 40 mg by mouth Daily As Needed.     • fluticasone (FLONASE) 50 MCG/ACT nasal spray 1 spray into the nostril(s) as directed by provider Daily.     • furosemide (LASIX) 20 MG tablet Take 20 mg by mouth Daily.     • GUAIFENESIN PO Take 40 mg by  "mouth Daily.     • hydrocortisone butyrate (LOCOID) 0.1 % ointment ointment Apply  topically to the appropriate area as directed 2 (Two) Times a Day As Needed.     • metaxalone (SKELAXIN) 800 MG tablet Take 800 mg by mouth. Usually takes 1-2 times daily as needed     • metoprolol tartrate (LOPRESSOR) 100 MG tablet Take 1 tablet by mouth 2 (Two) Times a Day. 180 tablet 1   • montelukast (SINGULAIR) 10 MG tablet Take 10 mg by mouth Daily.     • omega-3 acid ethyl esters (LOVAZA) 1 g capsule TAKE 2 CAPSULES BY MOUTH 2 (TWO) TIMES DAILY.     • vardenafil (LEVITRA) 20 MG tablet Take 20 mg by mouth Daily As Needed for Erectile Dysfunction.       No facility-administered medications prior to visit.       Allergies as of 10/31/2022 - Reviewed 10/31/2022   Allergen Reaction Noted   • Procaine Itching and Swelling 09/30/2014   • Oxycodone Rash 09/30/2014   • Penicillins Rash 09/30/2014     Social History     Socioeconomic History   • Marital status:    Tobacco Use   • Smoking status: Never   • Smokeless tobacco: Never   Substance and Sexual Activity   • Alcohol use: Yes     Comment: socialCaffeine use: None   • Drug use: Never   • Sexual activity: Yes     Partners: Female     Family History   Problem Relation Age of Onset   • Heart disease Mother         VSD   • Other Father         shy drager syndrome   • Diabetes Sister        Review of Systems   Constitutional: Positive for malaise/fatigue.   Cardiovascular:        SEE HPI   Respiratory: Negative for shortness of breath.    Hematologic/Lymphatic: Negative for bleeding problem.   Gastrointestinal: Negative for melena.   Genitourinary: Negative for hematuria.   Neurological: Negative for dizziness.   Psychiatric/Behavioral: Negative for altered mental status.          Objective:     Vitals:    10/31/22 0941   BP: 144/84   BP Location: Left arm   Patient Position: Sitting   Cuff Size: Adult   Pulse: 87   Weight: 125 kg (275 lb 3.2 oz)   Height: 172.7 cm (67.99\") "     Body mass index is 41.85 kg/m².      PHYSICAL EXAM:  Constitutional:       General: Not in acute distress.     Appearance: Well-developed. Obese. Not diaphoretic.   Eyes:      Pupils: Pupils are equal, round, and reactive to light.   HENT:      Head: Normocephalic and atraumatic.   Pulmonary:      Effort: Pulmonary effort is normal. No respiratory distress.      Breath sounds: Normal breath sounds. No wheezing. No rales.   Cardiovascular:      Normal rate. Irregularly irregular rhythm.      Murmurs: There is no murmur.      No gallop. No click. No rub.   Edema:     Peripheral edema absent.   Abdominal:      General: Bowel sounds are normal.      Palpations: Abdomen is soft.   Musculoskeletal: Normal range of motion.         General: No tenderness or deformity. Skin:     General: Skin is warm and dry.      Findings: No erythema.   Neurological:      Mental Status: Alert and oriented to person, place, and time.   Psychiatric:         Behavior: Behavior normal.         Judgment: Judgment normal.             ECG 12 Lead    Date/Time: 10/31/2022 9:55 AM  Performed by: Piedad Samson DNP, APRN  Authorized by: Piedad Samson DNP, APRN   Comparison: compared with previous ECG from 4/25/2022  Rhythm: atrial fibrillation  BPM: 87    Clinical impression: abnormal EKG  Comments: Back in rate controlled atrial fibrillation in the office today              Assessment:       Diagnosis Plan   1. Paroxysmal atrial fibrillation (HCC)        2. Chronic anticoagulation        3. Essential hypertension              Plan:     1. Paroxysmal atrial fibrillation: In rate controlled atrial fibrillation in the office today.  Remains anticoagulated with Eliquis.  On metoprolol tartrate.  He is normally in sinus rhythm with occasional episodes of atrial fibrillation.  He will continue to take Eliquis every 12 hours without missed doses.  He will monitor heart rate at home to ensure it remains well controlled.  He will call if not  back in normal rhythm in a few days or if he becomes symptomatic, discussed in detail.  We also discussed EP referral to discuss other possible rhythm control strategies and patient would like to proceed with this.  Referral placed.  Recommended working on healthy weight loss as well.  His sleep apnea unfortunately remains untreated and we discussed the role that this plays as well.  2. Chronic anticoagulation: With Eliquis.  Tolerating well without falls or abnormal bleeding.  3. Hypertension: Has been well controlled, 120s over 70s.  He has some increased stress that he feels is contributing to higher than normal blood pressure reading today.  He will continue to monitor and call if staying greater than 130/80 on average.  4. Dyslipidemia: PCP managing  5. Untreated sleep apnea  6. Obesity: Recommended easing into some light exercise and slowly increasing as tolerated.    Patient to follow-up with Dr. Hernandez in 6 months or follow-up sooner if needed for any new, recurrent, or worsening symptoms or concerns.  Discussed in detail signs/symptoms that warrant sooner call or follow-up to the office or ER visit.           Your medication list          Accurate as of October 31, 2022  9:57 AM. If you have any questions, ask your nurse or doctor.            CONTINUE taking these medications      Instructions Last Dose Given Next Dose Due   amLODIPine-benazepril 10-20 MG per capsule  Commonly known as: LOTREL      TAKE 1 CAPSULE BY MOUTH EVERY DAY       chlorpheniramine 4 MG tablet  Commonly known as: CHLOR-TRIMETON      Take 4 mg by mouth Every 6 (Six) Hours As Needed for Allergies.       diphenhydrAMINE 25 mg capsule  Commonly known as: BENADRYL      Take 25 mg by mouth Daily.       Eliquis 5 MG tablet tablet  Generic drug: apixaban      TAKE 1 TABLET BY MOUTH TWICE A DAY       famotidine 40 MG tablet  Commonly known as: PEPCID      Take 40 mg by mouth Daily As Needed.       fluticasone 50 MCG/ACT nasal spray  Commonly  known as: FLONASE      1 spray into the nostril(s) as directed by provider Daily.       furosemide 20 MG tablet  Commonly known as: LASIX      Take 20 mg by mouth Daily.       GUAIFENESIN PO      Take 40 mg by mouth Daily.       hydrocortisone butyrate 0.1 % ointment ointment  Commonly known as: LOCOID      Apply  topically to the appropriate area as directed 2 (Two) Times a Day As Needed.       metaxalone 800 MG tablet  Commonly known as: SKELAXIN      Take 800 mg by mouth. Usually takes 1-2 times daily as needed       metoprolol tartrate 100 MG tablet  Commonly known as: LOPRESSOR      Take 1 tablet by mouth 2 (Two) Times a Day.       montelukast 10 MG tablet  Commonly known as: SINGULAIR      Take 10 mg by mouth Daily.       omega-3 acid ethyl esters 1 g capsule  Commonly known as: LOVAZA      TAKE 2 CAPSULES BY MOUTH 2 (TWO) TIMES DAILY.       vardenafil 20 MG tablet  Commonly known as: LEVITRA      Take 20 mg by mouth Daily As Needed for Erectile Dysfunction.              The above medication changes may not have been made by this provider.  Patient's medication list was updated to reflect medications they are currently taking including medication changes made by other providers.            Thanks,    Piedad Samson, DNP, APRN  10/31/2022         Dictated utilizing Dragon dictation

## 2022-12-06 RX ORDER — AMLODIPINE BESYLATE AND BENAZEPRIL HYDROCHLORIDE 10; 20 MG/1; MG/1
1 CAPSULE ORAL DAILY
Qty: 90 CAPSULE | Refills: 1 | Status: SHIPPED | OUTPATIENT
Start: 2022-12-06

## 2023-04-04 DIAGNOSIS — R00.2 PALPITATIONS: ICD-10-CM

## 2023-04-04 RX ORDER — METOPROLOL TARTRATE 100 MG/1
TABLET ORAL
Qty: 180 TABLET | Refills: 1 | Status: SHIPPED | OUTPATIENT
Start: 2023-04-04

## 2023-05-08 ENCOUNTER — TELEPHONE (OUTPATIENT)
Dept: CARDIOLOGY | Facility: CLINIC | Age: 63
End: 2023-05-08

## 2023-05-08 ENCOUNTER — OFFICE VISIT (OUTPATIENT)
Dept: CARDIOLOGY | Facility: CLINIC | Age: 63
End: 2023-05-08
Payer: COMMERCIAL

## 2023-05-08 VITALS
BODY MASS INDEX: 43.35 KG/M2 | HEART RATE: 72 BPM | HEIGHT: 68 IN | DIASTOLIC BLOOD PRESSURE: 84 MMHG | SYSTOLIC BLOOD PRESSURE: 120 MMHG | WEIGHT: 286 LBS

## 2023-05-08 DIAGNOSIS — I48.0 PAROXYSMAL ATRIAL FIBRILLATION: ICD-10-CM

## 2023-05-08 DIAGNOSIS — I10 ESSENTIAL HYPERTENSION: ICD-10-CM

## 2023-05-08 DIAGNOSIS — R00.2 PALPITATIONS: ICD-10-CM

## 2023-05-08 DIAGNOSIS — G47.33 OSA (OBSTRUCTIVE SLEEP APNEA): ICD-10-CM

## 2023-05-08 DIAGNOSIS — R06.09 DOE (DYSPNEA ON EXERTION): Primary | ICD-10-CM

## 2023-05-08 PROCEDURE — 93000 ELECTROCARDIOGRAM COMPLETE: CPT | Performed by: INTERNAL MEDICINE

## 2023-05-08 PROCEDURE — 99214 OFFICE O/P EST MOD 30 MIN: CPT | Performed by: INTERNAL MEDICINE

## 2023-05-08 NOTE — PROGRESS NOTES
Date of Office Visit: 2023  Encounter Provider: Ange Hernandez MD  Place of Service: [facility] CARDIOLOGY  Patient Name: Danyel Robertson  :1960    Chief complaint  Paroxysmal atrial fibrillation, hypertension    History of Present Illness  Patient is 62-year-old gentleman history of hypertension, reflux disease, dyslipidemia, obstructive sleep apnea (untreated): 2020 was noted to have atrial fibrillation.  He was treated with Lopressor and Eliquis.  Echo showed an ejection fraction 64%, mildly dilated left atrium no significant valvular heart disease.  On  a stress perfusion study was negative for ischemia.    Since last visit he has had more palpitations and he believes more atrial fibrillation.  He notes more stress with the loss of his mother and care of her estate.  He also notes this occurs several times a month.  At times it may last for several hours.  It last occurred on Saturday when they lost electricity.  There was evidence of atrial fibrillation at last visit in 2023 with JODI Samson.  Blood pressure appears to be stable.  He has shortness of breath that is more noticeable but more often when he bends over.  He has also gained 9 pounds.  He thinks it may be slightly improved when he walks..  He has had chronic dependent edema that is unchanged.  He denies any dizziness or chest pain.    No recent blood work on file but he is to have this next week with Dr. Nelson.      Past Medical History:   Diagnosis Date   • Arthritis    • GERD (gastroesophageal reflux disease)    • Hypertension    • New onset atrial fibrillation 2020   • Seasonal allergies      Past Surgical History:   Procedure Laterality Date   • KNEE ARTHROSCOPY W/ MENISCAL REPAIR Left 10/2011   • WISDOM TOOTH EXTRACTION       Outpatient Medications Prior to Visit   Medication Sig Dispense Refill   • amLODIPine-benazepril (LOTREL) 10-20 MG per capsule Take 1 capsule by mouth Daily. 90  capsule 1   • chlorpheniramine (CHLOR-TRIMETON) 4 MG tablet Take 1 tablet by mouth Every 6 (Six) Hours As Needed for Allergies.     • diphenhydrAMINE (BENADRYL) 25 mg capsule Take 1 capsule by mouth Daily.     • Eliquis 5 MG tablet tablet TAKE 1 TABLET BY MOUTH TWICE A  tablet 3   • famotidine (PEPCID) 40 MG tablet Take 1 tablet by mouth Daily As Needed.     • fluticasone (FLONASE) 50 MCG/ACT nasal spray 1 spray into the nostril(s) as directed by provider Daily.     • furosemide (LASIX) 20 MG tablet Take 1 tablet by mouth Daily.     • GUAIFENESIN PO Take 40 mg by mouth Daily.     • hydrocortisone butyrate (LOCOID) 0.1 % ointment ointment Apply  topically to the appropriate area as directed 2 (Two) Times a Day As Needed.     • metaxalone (SKELAXIN) 800 MG tablet Take 1 tablet by mouth. Usually takes 1-2 times daily as needed     • metoprolol tartrate (LOPRESSOR) 100 MG tablet TAKE 1 TABLET BY MOUTH TWICE A  tablet 1   • montelukast (SINGULAIR) 10 MG tablet Take 1 tablet by mouth Daily.     • omega-3 acid ethyl esters (LOVAZA) 1 g capsule TAKE 2 CAPSULES BY MOUTH 2 (TWO) TIMES DAILY.     • vardenafil (LEVITRA) 20 MG tablet Take 1 tablet by mouth Daily As Needed for Erectile Dysfunction.       No facility-administered medications prior to visit.       Allergies as of 05/08/2023 - Reviewed 05/08/2023   Allergen Reaction Noted   • Procaine Itching and Swelling 09/30/2014   • Oxycodone Rash 09/30/2014   • Penicillins Rash 09/30/2014     Social History     Socioeconomic History   • Marital status:    Tobacco Use   • Smoking status: Never   • Smokeless tobacco: Never   Substance and Sexual Activity   • Alcohol use: Yes     Comment: socialCaffeine use: None   • Drug use: Never   • Sexual activity: Yes     Partners: Female     Family History   Problem Relation Age of Onset   • Heart disease Mother         VSD   • Other Father         shy drager syndrome   • Diabetes Sister      Review of Systems  "  Constitutional: Negative for chills, fever, weight gain and weight loss.   Cardiovascular: Positive for dyspnea on exertion, leg swelling and palpitations.   Respiratory: Negative for cough, snoring and wheezing.    Hematologic/Lymphatic: Negative for bleeding problem. Does not bruise/bleed easily.   Skin: Negative for color change.   Musculoskeletal: Negative for falls, joint pain and myalgias.   Gastrointestinal: Negative for melena.   Genitourinary: Negative for hematuria.   Neurological: Negative for excessive daytime sleepiness.   Psychiatric/Behavioral: Negative for depression. The patient is not nervous/anxious.         Objective:     Vitals:    05/08/23 0930   BP: 120/84   Pulse: 72   Weight: 130 kg (286 lb)   Height: 172.7 cm (67.99\")     Body mass index is 43.5 kg/m².    Vitals reviewed.   Constitutional:       Appearance: Well-developed.   Eyes:      General: No scleral icterus.        Right eye: No discharge.      Conjunctiva/sclera: Conjunctivae normal.      Pupils: Pupils are equal, round, and reactive to light.   HENT:      Head: Normocephalic.      Nose: Nose normal.   Neck:      Thyroid: No thyromegaly.      Vascular: No JVD.   Pulmonary:      Effort: Pulmonary effort is normal. No respiratory distress.      Breath sounds: Normal breath sounds. No wheezing. No rales.   Cardiovascular:      Normal rate. Irregularly irregular rhythm. Normal S1. Normal S2.      Murmurs: There is no murmur.      No gallop.   Pulses:     Carotid: 2+ bilaterally.     Radial: 2+ bilaterally.     Dorsalis pedis: 2+ bilaterally.     Posterior tibial: 1+ bilaterally.  Edema:     Pretibial: bilateral 1+ edema of the pretibial area.     Ankle: bilateral 1+ edema of the ankle.  Abdominal:      General: Bowel sounds are normal. There is no distension.      Palpations: Abdomen is soft.      Tenderness: There is no abdominal tenderness. There is no rebound.   Musculoskeletal: Normal range of motion.         General: No " tenderness.      Cervical back: Normal range of motion and neck supple. Skin:     General: Skin is warm and dry.      Findings: No erythema or rash.   Neurological:      Mental Status: Alert and oriented to person, place, and time.   Psychiatric:         Behavior: Behavior normal.         Thought Content: Thought content normal.         Judgment: Judgment normal.       Lab Review:         ECG 12 Lead    Date/Time: 5/8/2023 9:31 AM  Performed by: Ange Hernandez MD  Authorized by: Ange Hernandez MD   Comparison: compared with previous ECG   Similar to previous ECG  Rhythm: atrial fibrillation    Clinical impression: abnormal EKG          Assessment:       Diagnosis Plan   1. HEBERT (dyspnea on exertion)  ECG 12 Lead    Adult Transthoracic Echo Complete W/ Cont if Necessary Per Protocol      2. Paroxysmal atrial fibrillation  ECG 12 Lead    Adult Transthoracic Echo Complete W/ Cont if Necessary Per Protocol    Ambulatory Referral to Cardiac Electrophysiology      3. Palpitations  ECG 12 Lead      4. RACH (obstructive sleep apnea)        5. Essential hypertension          Plan:       1.  Dyspnea.  Suspect multifactorial with deconditioning and weight gain.  However is also has untreated sleep apnea and atrial fibrillation seems to be more easily provoked.  We will check an echocardiogram.  We will also check a proBNP. He will to have this done with  whom he is to see next week.  2.  Persistent/paroxysmal atrial fibrillation.  Unclear how much of the time he is in atrial fibrillation.  He will start using his iPhone monitor to assess further.  He also asked me about possible ablation.  I have referred him to EP service.  We will also check an echocardiogram.  I also reviewed with him that the chance of success would be much higher if he would treat sleep apnea.  3.  Chronic anticoagulant use, Eliquis  4.  Hypertension.  Controlled  5.  Edema.  Improved the use of support stockings.  Also recommended/discussed possibly  increasing Lasix but he did not wish to do so at this time.  6.  Untreated sleep apnea.    7.  Dyslipidemia  8.  Obesity    Time Spent: I spent 35 minutes caring for Danyel on this date of service. This time includes time spent by me in the following activities: preparing for the visit, reviewing tests, obtaining and/or reviewing a separately obtained history, performing a medically appropriate examination and/or evaluation, counseling and educating the patient/family/caregiver, ordering medications, tests, or procedures, documenting information in the medical record and independently interpreting results and communicating that information with the patient/family/caregiver.   I spent 1 minutes on the separately reported service of ECG. This time is not included in the time used to support the E/M service also reported today.        Your medication list          Accurate as of May 8, 2023 11:59 PM. If you have any questions, ask your nurse or doctor.            CONTINUE taking these medications      Instructions Last Dose Given Next Dose Due   amLODIPine-benazepril 10-20 MG per capsule  Commonly known as: LOTREL      Take 1 capsule by mouth Daily.       chlorpheniramine 4 MG tablet  Commonly known as: CHLOR-TRIMETON      Take 1 tablet by mouth Every 6 (Six) Hours As Needed for Allergies.       diphenhydrAMINE 25 mg capsule  Commonly known as: BENADRYL      Take 1 capsule by mouth Daily.       Eliquis 5 MG tablet tablet  Generic drug: apixaban      TAKE 1 TABLET BY MOUTH TWICE A DAY       famotidine 40 MG tablet  Commonly known as: PEPCID      Take 1 tablet by mouth Daily As Needed.       fluticasone 50 MCG/ACT nasal spray  Commonly known as: FLONASE      1 spray into the nostril(s) as directed by provider Daily.       furosemide 20 MG tablet  Commonly known as: LASIX      Take 1 tablet by mouth Daily.       GUAIFENESIN PO      Take 40 mg by mouth Daily.       hydrocortisone butyrate 0.1 % ointment ointment  Commonly  known as: LOCOID      Apply  topically to the appropriate area as directed 2 (Two) Times a Day As Needed.       metaxalone 800 MG tablet  Commonly known as: SKELAXIN      Take 1 tablet by mouth. Usually takes 1-2 times daily as needed       metoprolol tartrate 100 MG tablet  Commonly known as: LOPRESSOR      TAKE 1 TABLET BY MOUTH TWICE A DAY       montelukast 10 MG tablet  Commonly known as: SINGULAIR      Take 1 tablet by mouth Daily.       omega-3 acid ethyl esters 1 g capsule  Commonly known as: LOVAZA      TAKE 2 CAPSULES BY MOUTH 2 (TWO) TIMES DAILY.       vardenafil 20 MG tablet  Commonly known as: LEVITRA      Take 1 tablet by mouth Daily As Needed for Erectile Dysfunction.              Patient is no longer taking -.  I corrected the med list to reflect this.  I did not stop these medications.      Dictated utilizing Dragon dictation

## 2023-05-16 NOTE — TELEPHONE ENCOUNTER
They are unable to send any information.  They will get a message to provider and call back with a verbal readout.

## 2023-05-18 NOTE — TELEPHONE ENCOUNTER
Please verify that this is the normal further lab.  Let the patient know.  Also him to proceed with echo as discussed and ordered

## 2023-05-18 NOTE — TELEPHONE ENCOUNTER
Danyel Robertson returned call.    Reviewed results and recommendations with patient and he verbalized understanding.  Patient is scheduled for echo on 5/22.    Thank you,  Mary Jo CAMP RN  Triage Nurse [facility] [place]

## 2023-05-22 ENCOUNTER — HOSPITAL ENCOUNTER (OUTPATIENT)
Dept: CARDIOLOGY | Facility: HOSPITAL | Age: 63
Discharge: HOME | End: 2023-05-22
Admitting: INTERNAL MEDICINE
Payer: COMMERCIAL

## 2023-05-22 VITALS
HEART RATE: 87 BPM | DIASTOLIC BLOOD PRESSURE: 80 MMHG | BODY MASS INDEX: 44.89 KG/M2 | SYSTOLIC BLOOD PRESSURE: 160 MMHG | HEIGHT: 67 IN | OXYGEN SATURATION: 96 % | WEIGHT: 286 LBS

## 2023-05-22 DIAGNOSIS — I48.0 PAROXYSMAL ATRIAL FIBRILLATION: ICD-10-CM

## 2023-05-22 DIAGNOSIS — R06.09 DOE (DYSPNEA ON EXERTION): ICD-10-CM

## 2023-05-22 LAB
AORTIC ARCH: 3 CM
ASCENDING AORTA: 3 CM
BH CV ECHO LEFT VENTRICLE GLOBAL LONGITUDINAL STRAIN: -22.3 %
BH CV ECHO MEAS - ACS: 1.8 CM
BH CV ECHO MEAS - AO MAX PG: 5.9 MMHG
BH CV ECHO MEAS - AO MEAN PG: 3 MMHG
BH CV ECHO MEAS - AO ROOT DIAM: 3.3 CM
BH CV ECHO MEAS - AO V2 MAX: 121 CM/SEC
BH CV ECHO MEAS - AO V2 VTI: 23.1 CM
BH CV ECHO MEAS - AVA(I,D): 2.6 CM2
BH CV ECHO MEAS - EDV(CUBED): 90 ML
BH CV ECHO MEAS - EDV(MOD-SP2): 83 ML
BH CV ECHO MEAS - EDV(MOD-SP4): 62 ML
BH CV ECHO MEAS - EF(MOD-BP): 58.1 %
BH CV ECHO MEAS - EF(MOD-SP2): 59 %
BH CV ECHO MEAS - EF(MOD-SP4): 58.1 %
BH CV ECHO MEAS - ESV(CUBED): 19.4 ML
BH CV ECHO MEAS - ESV(MOD-SP2): 34 ML
BH CV ECHO MEAS - ESV(MOD-SP4): 26 ML
BH CV ECHO MEAS - FS: 40.1 %
BH CV ECHO MEAS - IVS/LVPW: 1.01 CM
BH CV ECHO MEAS - IVSD: 1.47 CM
BH CV ECHO MEAS - LAT PEAK E' VEL: 11.3 CM/SEC
BH CV ECHO MEAS - LV DIASTOLIC VOL/BSA (35-75): 26.3 CM2
BH CV ECHO MEAS - LV MASS(C)D: 264.9 GRAMS
BH CV ECHO MEAS - LV MAX PG: 3.1 MMHG
BH CV ECHO MEAS - LV MEAN PG: 2 MMHG
BH CV ECHO MEAS - LV SYSTOLIC VOL/BSA (12-30): 11 CM2
BH CV ECHO MEAS - LV V1 MAX: 87.8 CM/SEC
BH CV ECHO MEAS - LV V1 VTI: 16.7 CM
BH CV ECHO MEAS - LVIDD: 4.5 CM
BH CV ECHO MEAS - LVIDS: 2.7 CM
BH CV ECHO MEAS - LVOT AREA: 3.5 CM2
BH CV ECHO MEAS - LVOT DIAM: 2.12 CM
BH CV ECHO MEAS - LVPWD: 1.46 CM
BH CV ECHO MEAS - MED PEAK E' VEL: 7.2 CM/SEC
BH CV ECHO MEAS - MR MAX PG: 122.7 MMHG
BH CV ECHO MEAS - MR MAX VEL: 553.8 CM/SEC
BH CV ECHO MEAS - MV DEC SLOPE: 715.1 CM/SEC2
BH CV ECHO MEAS - MV DEC TIME: 0.15 MSEC
BH CV ECHO MEAS - MV E MAX VEL: 118.7 CM/SEC
BH CV ECHO MEAS - MV MAX PG: 7.7 MMHG
BH CV ECHO MEAS - MV MEAN PG: 4 MMHG
BH CV ECHO MEAS - MV P1/2T: 52.8 MSEC
BH CV ECHO MEAS - MV V2 VTI: 24 CM
BH CV ECHO MEAS - MVA(P1/2T): 4.2 CM2
BH CV ECHO MEAS - MVA(VTI): 2.46 CM2
BH CV ECHO MEAS - PA ACC TIME: 0.06 SEC
BH CV ECHO MEAS - PA PR(ACCEL): 50.5 MMHG
BH CV ECHO MEAS - PA V2 MAX: 120.8 CM/SEC
BH CV ECHO MEAS - PULM DIAS VEL: 48.2 CM/SEC
BH CV ECHO MEAS - PULM S/D: 0.62
BH CV ECHO MEAS - PULM SYS VEL: 29.8 CM/SEC
BH CV ECHO MEAS - QP/QS: 0.96
BH CV ECHO MEAS - RAP SYSTOLE: 15 MMHG
BH CV ECHO MEAS - RV MAX PG: 2.07 MMHG
BH CV ECHO MEAS - RV V1 MAX: 72 CM/SEC
BH CV ECHO MEAS - RV V1 VTI: 15 CM
BH CV ECHO MEAS - RVOT DIAM: 2.19 CM
BH CV ECHO MEAS - RVSP: 25 MMHG
BH CV ECHO MEAS - SI(MOD-SP2): 20.8 ML/M2
BH CV ECHO MEAS - SI(MOD-SP4): 15.3 ML/M2
BH CV ECHO MEAS - SUP REN AO DIAM: 2.5 CM
BH CV ECHO MEAS - SV(LVOT): 59 ML
BH CV ECHO MEAS - SV(MOD-SP2): 49 ML
BH CV ECHO MEAS - SV(MOD-SP4): 36 ML
BH CV ECHO MEAS - SV(RVOT): 56.7 ML
BH CV ECHO MEAS - TAPSE (>1.6): 1.89 CM
BH CV ECHO MEAS - TR MAX PG: 10.6 MMHG
BH CV ECHO MEAS - TR MAX VEL: 162.9 CM/SEC
BH CV ECHO MEASUREMENTS AVERAGE E/E' RATIO: 12.83
BH CV XLRA - RV BASE: 3.3 CM
BH CV XLRA - RV LENGTH: 8.5 CM
BH CV XLRA - RV MID: 3.1 CM
BH CV XLRA - TDI S': 13.6 CM/SEC
LEFT ATRIUM VOLUME INDEX: 45.1 ML/M2
MAXIMAL PREDICTED HEART RATE: 159 BPM
SINUS: 3.1 CM
STJ: 3.2 CM
STRESS TARGET HR: 135 BPM

## 2023-05-22 PROCEDURE — 93306 TTE W/DOPPLER COMPLETE: CPT | Performed by: INTERNAL MEDICINE

## 2023-05-22 PROCEDURE — 93306 TTE W/DOPPLER COMPLETE: CPT

## 2023-05-22 PROCEDURE — 93356 MYOCRD STRAIN IMG SPCKL TRCK: CPT

## 2023-05-22 PROCEDURE — 93356 MYOCRD STRAIN IMG SPCKL TRCK: CPT | Performed by: INTERNAL MEDICINE

## 2023-05-23 ENCOUNTER — TELEPHONE (OUTPATIENT)
Dept: CARDIOLOGY | Facility: CLINIC | Age: 63
End: 2023-05-23
Payer: COMMERCIAL

## 2023-05-23 NOTE — TELEPHONE ENCOUNTER
Please let him know that echo looks good.  His heart is strong and functioning well.  He does have increased atrial volume and we would continue to recommend increasing furosemide slightly to 40 mg/day (20 mg twice daily).  This may help his shortness of breath.  When he saw Dr. Hernandez earlier this month he did not wish to make this change.

## 2023-05-23 NOTE — TELEPHONE ENCOUNTER
Reviewed results and recommendations with Daneyl Robertson.  Patient verbalized understanding of results and recommendations.  Patient stated he will call provider that prescribes his Lasix regarding increased dosage.    Patient reports he missed a call to schedule new patient appointment with EP.  Transferred call to [facility] in scheduling to arrange.    Thank you,  Mary Jo CAMP RN  Triage Nurse [facility] [place]

## 2023-05-23 NOTE — TELEPHONE ENCOUNTER
EP appointment scheduled for 10/16.    Thank you,  Mary Jo CAMP RN  Triage Nurse [facility] [place]

## 2023-05-31 RX ORDER — AMLODIPINE BESYLATE AND BENAZEPRIL HYDROCHLORIDE 10; 20 MG/1; MG/1
1 CAPSULE ORAL DAILY
Qty: 90 CAPSULE | Refills: 1 | OUTPATIENT
Start: 2023-05-31

## 2023-06-01 RX ORDER — AMLODIPINE BESYLATE AND BENAZEPRIL HYDROCHLORIDE 10; 20 MG/1; MG/1
CAPSULE ORAL
Qty: 90 CAPSULE | Refills: 1 | Status: SHIPPED | OUTPATIENT
Start: 2023-06-01

## 2023-06-01 NOTE — TELEPHONE ENCOUNTER
Last OV 5/8/23.  Next OV 10/16/23.  Labs 11/7/22.  Does not meet protocol.  Please advise.      NICKOLAS CARVALHO

## 2023-09-14 DIAGNOSIS — R00.2 PALPITATIONS: ICD-10-CM

## 2023-09-15 RX ORDER — METOPROLOL TARTRATE 100 MG/1
TABLET ORAL
Qty: 180 TABLET | Refills: 1 | Status: SHIPPED | OUTPATIENT
Start: 2023-09-15

## 2023-10-16 ENCOUNTER — TELEPHONE (OUTPATIENT)
Dept: CARDIOLOGY | Facility: CLINIC | Age: 63
End: 2023-10-16

## 2023-10-16 NOTE — TELEPHONE ENCOUNTER
Caller: Danyel Robertson    Relationship to patient: Self    Best call back number: [phone]      Type of visit: NEW PT APPT.    Requested date: ASAP     If rescheduling, when is the original appointment: 10.16.23     Additional notes:PT ID NEEDING TO R/S APPT ON 10.16.23. DR QUIROGA IS BOOKED UNTIL JIMBO PLEASE ADVISE WHEN PT NEEDS TO BE SEEN.

## 2023-10-16 NOTE — TELEPHONE ENCOUNTER
10/16- 1X called pt to reschd// no answer// let pt know I got him reschd for Jan 10th @ 11am// pt is going to call back and confirm appt date time// also added to waitlist

## 2023-11-10 NOTE — PROGRESS NOTES
Date of Office Visit: 2023  Encounter Provider: JODI Trivedi  Place of Service: [facility] CARDIOLOGY  Patient Name: Danyel Robertson  :1960    Chief complaint  Paroxysmal atrial fibrillation, hypertension     History of Present Illness  Patient is a 63 y.o. year old male  patient of Dr. Hernandez. Past medical history includes hypertension, reflux disease, dyslipidemia, obstructive sleep apnea (untreated): 2020 was noted to have atrial fibrillation.  He was treated with Lopressor and Eliquis.  Echo showed an ejection fraction 64%, mildly dilated left atrium no significant valvular heart disease.  On  a stress perfusion study was negative for ischemia. At last visit, he was complaining of increasing palpitations and possible atrial fibrillation.      Interval history  Patient presents today for routine follow-up.  I will visit with him for the first time today and have reviewed his medical record.  Since last visit he continues to leave sleep apnea untreated.  He denies shortness of breath, edema, dizziness, chest pain or chest pressure, fatigue, syncope or presyncope.  He reports palpitations are stable and at his baseline.  He is walking at work every day and denies exertional symptoms.  Blood pressure today is slightly elevated but he reports blood pressure at home is better and normally less than 130/80 on average.  No falls or abnormal bleeding on Eliquis.  He does have upcoming appointment in 2024 with EP service.    Past Medical History:   Diagnosis Date    Arthritis     GERD (gastroesophageal reflux disease)     Hypertension     New onset atrial fibrillation 2020    Seasonal allergies      Past Surgical History:   Procedure Laterality Date    KNEE ARTHROSCOPY W/ MENISCAL REPAIR Left 10/2011    WISDOM TOOTH EXTRACTION       Outpatient Medications Prior to Visit   Medication Sig Dispense Refill    amLODIPine-benazepril (LOTREL) 10-20 MG per  capsule TAKE 1 CAPSULE BY MOUTH EVERY DAY 90 capsule 1    apixaban (Eliquis) 5 MG tablet tablet Take 1 tablet by mouth 2 (Two) Times a Day. 180 tablet 3    chlorpheniramine (CHLOR-TRIMETON) 4 MG tablet Take 1 tablet by mouth Every 6 (Six) Hours As Needed for Allergies.      diphenhydrAMINE (BENADRYL) 25 mg capsule Take 1 capsule by mouth Daily.      famotidine (PEPCID) 40 MG tablet Take 1 tablet by mouth Daily As Needed.      fluticasone (FLONASE) 50 MCG/ACT nasal spray 1 spray into the nostril(s) as directed by provider Daily.      furosemide (LASIX) 20 MG tablet Take 1 tablet by mouth Daily.      GUAIFENESIN PO Take 40 mg by mouth Daily.      hydrocortisone butyrate (LOCOID) 0.1 % ointment ointment Apply  topically to the appropriate area as directed 2 (Two) Times a Day As Needed.      metaxalone (SKELAXIN) 800 MG tablet Take 1 tablet by mouth. Usually takes 1-2 times daily as needed      metoprolol tartrate (LOPRESSOR) 100 MG tablet TAKE 1 TABLET BY MOUTH TWICE A  tablet 1    montelukast (SINGULAIR) 10 MG tablet Take 1 tablet by mouth Daily.      omega-3 acid ethyl esters (LOVAZA) 1 g capsule TAKE 2 CAPSULES BY MOUTH 2 (TWO) TIMES DAILY.      vardenafil (LEVITRA) 20 MG tablet Take 1 tablet by mouth Daily As Needed for Erectile Dysfunction.       No facility-administered medications prior to visit.       Allergies as of 11/13/2023 - Reviewed 11/13/2023   Allergen Reaction Noted    Procaine Itching and Swelling 09/30/2014    Oxycodone Rash 09/30/2014    Penicillins Rash 09/30/2014     Social History     Socioeconomic History    Marital status:    Tobacco Use    Smoking status: Never    Smokeless tobacco: Never   Vaping Use    Vaping Use: Never used   Substance and Sexual Activity    Alcohol use: Yes     Comment: socialCaffeine use: None    Drug use: Never    Sexual activity: Yes     Partners: Female     Family History   Problem Relation Age of Onset    Heart disease Mother         VSD    Other Father   "       shy drager syndrome    Diabetes Sister      Review of Systems   Constitutional: Negative for malaise/fatigue.   HENT:  Negative for nosebleeds.    Cardiovascular:  Positive for palpitations. Negative for chest pain, claudication, dyspnea on exertion, leg swelling, near-syncope, orthopnea, paroxysmal nocturnal dyspnea and syncope.   Respiratory:  Negative for shortness of breath.    Hematologic/Lymphatic: Negative for bleeding problem. Does not bruise/bleed easily.   Gastrointestinal:  Negative for hematemesis, hematochezia and melena.   Genitourinary:  Negative for hematuria.   Neurological:  Negative for brief paralysis, dizziness, headaches and light-headedness.   All other systems reviewed and are negative.       Objective:     Vitals:    11/13/23 1032   BP: 132/78   Pulse: 67   SpO2: 99%   Weight: 127 kg (280 lb 9.6 oz)   Height: 170.2 cm (67\")     Body mass index is 43.95 kg/m².    Vitals reviewed.   Constitutional:       General: Not in acute distress.     Appearance: Well-developed and not in distress. Not diaphoretic.   HENT:      Head: Normocephalic.   Pulmonary:      Effort: Pulmonary effort is normal. No respiratory distress.      Breath sounds: Normal breath sounds. No wheezing. No rhonchi. No rales.   Cardiovascular:      Normal rate. Regular rhythm.      Murmurs: There is no murmur.   Pulses:     Radial: 2+ bilaterally.  Edema:     Peripheral edema absent.   Skin:     General: Skin is warm and dry. There is no cyanosis.      Findings: No rash.   Neurological:      Mental Status: Alert and oriented to person, place, and time.   Psychiatric:         Behavior: Behavior normal. Behavior is cooperative.         Thought Content: Thought content normal.         Judgment: Judgment normal.       Lab Review:     Lab Results   Component Value Date     05/09/2022     11/08/2021    K 4.1 05/09/2022    K 4.8 11/08/2021     05/09/2022    CL 98 11/08/2021    CO2 24 05/09/2022    CO2 25 " 11/08/2021    BUN 12 05/09/2022    BUN 13 11/08/2021    CREATININE 0.75 05/09/2022    CREATININE 0.8 11/08/2021    EGFRIFNONA 94 11/09/2020    EGFRIFAFRI 113 11/09/2020    GLUCOSE 114 (H) 11/09/2020    CALCIUM 8.7 05/09/2022    CALCIUM 9.7 11/08/2021    ALBUMIN 4.5 05/09/2022    ALBUMIN 4.2 11/08/2021    BILITOT 0.8 05/09/2022    BILITOT 0.9 11/08/2021    AST 25 05/09/2022    AST 25 11/08/2021    ALT 33 05/09/2022    ALT 24 11/08/2021     Lab Results   Component Value Date    WBC 10.92 05/15/2023    WBC 10.67 11/07/2022    HGB 15.6 05/15/2023    HGB 17.9 (H) 11/07/2022    HCT 46.9 05/15/2023    HCT 53.4 (H) 11/07/2022    MCV 92.0 05/15/2023    MCV 90.2 11/07/2022     05/15/2023     11/07/2022     Lab Results   Component Value Date    PROBNP 49.5 11/09/2020     (H) 06/24/2023    .6 (H) 05/15/2023     Lab Results   Component Value Date    TROPONINT <0.010 11/09/2020     Lab Results   Component Value Date    TSH 3.680 11/08/2021    TSH 2.590 11/09/2020             ECG 12 Lead    Date/Time: 11/13/2023 10:47 AM  Performed by: Aleida Mc APRN    Authorized by: Aleida Mc APRN  Comparison: compared with previous ECG   Similar to previous ECG  Rhythm: atrial fibrillation  Rate: normal  BPM: 78  QRS axis: normal  Comments: Similar to prior.  Atrial fibrillation remains        Assessment:       Diagnosis Plan   1. Paroxysmal atrial fibrillation        2. Essential hypertension        3. RACH (obstructive sleep apnea)        4. Chronic anticoagulation        5. Edema leg        6. HEBERT (dyspnea on exertion)          Plan:       1.  Dyspnea.  Suspect multifactorial with deconditioning and weight gain.  However is also has untreated sleep apnea and atrial fibrillation seems to be more easily provoked.  proBNP normal and echocardiogram stable.  He has appointment in January with the EP service.    2.  Persistent/paroxysmal atrial fibrillation.  Unclear how much of the time he is in  atrial fibrillation.  Discussed that he can take an extra 50 mg of metoprolol in between his scheduled doses for worsening palpitations as long as SBP greater than 110 mmHg.  I also reviewed with him that the chance of success would be much higher if he would treat sleep apnea.  3.  Chronic anticoagulant use, Eliquis with no falls or abnormal bleeding  4.  Hypertension.  Controlled on current regimen  5.  Edema.  Improved the use of support stockings.    6.  Untreated sleep apnea.  This complicates all areas of care.  Discussed with him again today that treating sleep apnea would help with edema and make controlling atrial fibrillation more likely.  7.  Dyslipidemia on Lovaza  8.  Obesity      Time Spent: I spent 30 minutes caring for Danyel on this date of service. This time includes time spent by me in the following activities: preparing for the visit, reviewing tests, performing a medically appropriate examination and/or evaluations, counseling and educating the patient/family/caregiver, ordering medications, tests, or procedures, documenting information in the medical record, and independently interpreting results and communicating that information with the patient/family/caregiver.   I spent 1 minutes on the separately reported service of ECG. This time is not included in the time used to support the E/M service also reported today.        Your medication list            Accurate as of November 13, 2023 10:50 AM. If you have any questions, ask your nurse or doctor.                CONTINUE taking these medications        Instructions Last Dose Given Next Dose Due   amLODIPine-benazepril 10-20 MG per capsule  Commonly known as: LOTREL      TAKE 1 CAPSULE BY MOUTH EVERY DAY       apixaban 5 MG tablet tablet  Commonly known as: Eliquis      Take 1 tablet by mouth 2 (Two) Times a Day.       chlorpheniramine 4 MG tablet  Commonly known as: CHLOR-TRIMETON      Take 1 tablet by mouth Every 6 (Six) Hours As Needed for  Allergies.       diphenhydrAMINE 25 mg capsule  Commonly known as: BENADRYL      Take 1 capsule by mouth Daily.       famotidine 40 MG tablet  Commonly known as: PEPCID      Take 1 tablet by mouth Daily As Needed.       fluticasone 50 MCG/ACT nasal spray  Commonly known as: FLONASE      1 spray into the nostril(s) as directed by provider Daily.       furosemide 20 MG tablet  Commonly known as: LASIX      Take 1 tablet by mouth Daily.       GUAIFENESIN PO      Take 40 mg by mouth Daily.       hydrocortisone butyrate 0.1 % ointment ointment  Commonly known as: LOCOID      Apply  topically to the appropriate area as directed 2 (Two) Times a Day As Needed.       metaxalone 800 MG tablet  Commonly known as: SKELAXIN      Take 1 tablet by mouth. Usually takes 1-2 times daily as needed       metoprolol tartrate 100 MG tablet  Commonly known as: LOPRESSOR      TAKE 1 TABLET BY MOUTH TWICE A DAY       montelukast 10 MG tablet  Commonly known as: SINGULAIR      Take 1 tablet by mouth Daily.       omega-3 acid ethyl esters 1 g capsule  Commonly known as: LOVAZA      TAKE 2 CAPSULES BY MOUTH 2 (TWO) TIMES DAILY.       vardenafil 20 MG tablet  Commonly known as: LEVITRA      Take 1 tablet by mouth Daily As Needed for Erectile Dysfunction.                Patient is no longer taking -.  I corrected the med list to reflect this.  I did not stop these medications.    No follow-ups on file.      Dictated utilizing Dragon dictation

## 2023-11-13 ENCOUNTER — OFFICE VISIT (OUTPATIENT)
Dept: CARDIOLOGY | Facility: CLINIC | Age: 63
End: 2023-11-13
Payer: COMMERCIAL

## 2023-11-13 VITALS
WEIGHT: 280.6 LBS | BODY MASS INDEX: 44.04 KG/M2 | DIASTOLIC BLOOD PRESSURE: 78 MMHG | OXYGEN SATURATION: 99 % | HEART RATE: 67 BPM | HEIGHT: 67 IN | SYSTOLIC BLOOD PRESSURE: 132 MMHG

## 2023-11-13 DIAGNOSIS — R06.09 DOE (DYSPNEA ON EXERTION): ICD-10-CM

## 2023-11-13 DIAGNOSIS — Z79.01 CHRONIC ANTICOAGULATION: ICD-10-CM

## 2023-11-13 DIAGNOSIS — R60.0 EDEMA LEG: ICD-10-CM

## 2023-11-13 DIAGNOSIS — I10 ESSENTIAL HYPERTENSION: ICD-10-CM

## 2023-11-13 DIAGNOSIS — G47.33 OSA (OBSTRUCTIVE SLEEP APNEA): ICD-10-CM

## 2023-11-13 DIAGNOSIS — I48.0 PAROXYSMAL ATRIAL FIBRILLATION: Primary | ICD-10-CM

## 2023-11-13 PROCEDURE — 93000 ELECTROCARDIOGRAM COMPLETE: CPT | Performed by: NURSE PRACTITIONER

## 2023-11-13 PROCEDURE — 99214 OFFICE O/P EST MOD 30 MIN: CPT | Performed by: NURSE PRACTITIONER

## 2023-11-13 RX ORDER — METOPROLOL TARTRATE 50 MG/1
50 TABLET, FILM COATED ORAL DAILY PRN
COMMUNITY

## 2023-11-27 RX ORDER — AMLODIPINE BESYLATE AND BENAZEPRIL HYDROCHLORIDE 10; 20 MG/1; MG/1
CAPSULE ORAL
Qty: 90 CAPSULE | Refills: 1 | Status: SHIPPED | OUTPATIENT
Start: 2023-11-27

## 2024-01-10 ENCOUNTER — OFFICE VISIT (OUTPATIENT)
Age: 64
End: 2024-01-10
Payer: COMMERCIAL

## 2024-01-10 VITALS — SYSTOLIC BLOOD PRESSURE: 126 MMHG | DIASTOLIC BLOOD PRESSURE: 88 MMHG | HEART RATE: 65 BPM

## 2024-01-10 DIAGNOSIS — I10 ESSENTIAL HYPERTENSION: ICD-10-CM

## 2024-01-10 DIAGNOSIS — G47.33 OSA (OBSTRUCTIVE SLEEP APNEA): ICD-10-CM

## 2024-01-10 DIAGNOSIS — I48.0 PAROXYSMAL ATRIAL FIBRILLATION: Primary | ICD-10-CM

## 2024-01-10 NOTE — PROGRESS NOTES
Date of Office Visit: 01/10/2024  Encounter Provider: Kolton Pearl MD  Place of Service: [facility] CARDIOLOGY  Patient Name: Danyel Robertson  : 1960    Subjective:     Encounter Date:01/10/2024      Patient ID: Danyel Robertson is a 63 y.o. male who has a cc of  sent by Dr Hernandez for AF     Started in 2019. Review of the ecgs show intermittent SR and AF     He has minimal symptoms. He sometimes feels the AF    A KARDIA device shows mostly AF lately    Echo shows bad LVH but a big LA     He is in AF and does not feel bad. He cannot tell       There have been no hospital admission since the last visit.     There have been no bleeding events.       Past Medical History:   Diagnosis Date    Arthritis     GERD (gastroesophageal reflux disease)     Hypertension     New onset atrial fibrillation 2020    Seasonal allergies        Social History     Socioeconomic History    Marital status:    Tobacco Use    Smoking status: Never    Smokeless tobacco: Never   Vaping Use    Vaping Use: Never used   Substance and Sexual Activity    Alcohol use: Yes     Comment: socialCaffeine use: None    Drug use: Never    Sexual activity: Yes     Partners: Female       Family History   Problem Relation Age of Onset    Heart disease Mother         VSD    Other Father         shy drager syndrome    Diabetes Sister        Review of Systems   Constitutional: Negative for fever and night sweats.   HENT:  Negative for ear pain and stridor.    Eyes:  Negative for discharge and visual halos.   Cardiovascular:  Negative for cyanosis.   Respiratory:  Negative for hemoptysis and sputum production.    Hematologic/Lymphatic: Negative for adenopathy.   Skin:  Negative for nail changes and unusual hair distribution.   Musculoskeletal:  Positive for arthritis, back pain and joint pain. Negative for gout and joint swelling.   Gastrointestinal:  Negative for bowel incontinence and flatus.   Genitourinary:  Negative for  dysuria and flank pain.   Neurological:  Negative for seizures and tremors.   Psychiatric/Behavioral:  Negative for altered mental status. The patient is not nervous/anxious.             Objective:     Vitals:    01/10/24 1108   BP: 126/88   Pulse: 65         Constitutional:       Appearance: Obese.   Eyes:      General:         Right eye: No discharge.         Left eye: No discharge.   HENT:      Head: Normocephalic and atraumatic.   Neck:      Thyroid: No thyromegaly.      Vascular: No JVD.   Pulmonary:      Effort: Pulmonary effort is normal.      Breath sounds: Normal breath sounds. No rales.   Cardiovascular:      Normal rate. Irregularly irregular rhythm.      No gallop.    Edema:     Peripheral edema absent.   Abdominal:      General: Bowel sounds are normal.      Palpations: Abdomen is soft.      Tenderness: There is no abdominal tenderness.   Musculoskeletal: Normal range of motion.         General: No deformity. Skin:     General: Skin is warm and dry.      Findings: No erythema.   Neurological:      Mental Status: Alert and oriented to person, place, and time.      Motor: Normal muscle tone.   Psychiatric:         Behavior: Behavior normal.         Thought Content: Thought content normal.           ECG 12 Lead    Date/Time: 1/10/2024 11:25 AM  Performed by: Kolton Pearl MD    Authorized by: Kolton Pearl MD  Comparison: compared with previous ECG   Similar to previous ECG  Rhythm: atrial fibrillation    Clinical impression: abnormal EKG          Lab Review:       Assessment:          Diagnosis Plan   1. Paroxysmal atrial fibrillation        2. Essential hypertension               Plan:     He has a lot of AF but minimal symptoms    Not only that, but he has a very large LA and very think heart.     He drinks two martini's a night and has untreated sleep apnea which makes PVI unlikely to work.     I rec to him more aggressive measures to treat cardio metabolic risks and decrease ETOH and exercise      I think current rate control strategy is excellent.

## 2024-02-26 RX ORDER — AMLODIPINE BESYLATE AND BENAZEPRIL HYDROCHLORIDE 10; 20 MG/1; MG/1
CAPSULE ORAL
Qty: 90 CAPSULE | Refills: 1 | Status: SHIPPED | OUTPATIENT
Start: 2024-02-26

## 2024-02-26 NOTE — TELEPHONE ENCOUNTER
Rx Refill Note  Requested Prescriptions     Pending Prescriptions Disp Refills    amLODIPine-benazepril (LOTREL) 10-20 MG per capsule [Pharmacy Med Name: AMLODIPINE-BENAZEPRIL 10-20 MG] 90 capsule 1     Sig: TAKE 1 CAPSULE BY MOUTH EVERY DAY      Last office visit with prescribing clinician: 11/13/2023   Last telemedicine visit with prescribing clinician: Visit date not found   Next office visit with prescribing clinician: 5/13/24 IRASEMA                         Would you like a call back once the refill request has been completed: [] Yes [] No    If the office needs to give you a call back, can they leave a voicemail: [] Yes [] No    Sorin Ji, FRANK  02/26/24, 09:13 EST    Return in about 6 months (around 5/13/2024) for with Dr. Hernandez.

## 2024-03-09 DIAGNOSIS — R00.2 PALPITATIONS: ICD-10-CM

## 2024-03-11 RX ORDER — METOPROLOL TARTRATE 100 MG/1
TABLET ORAL
Qty: 180 TABLET | Refills: 1 | Status: SHIPPED | OUTPATIENT
Start: 2024-03-11

## 2024-04-25 NOTE — TELEPHONE ENCOUNTER
Chief complaint:   Chief Complaint   Patient presents with   • Office Visit   • Back Pain     Intermittent for about a month       Vitals:  Visit Vitals  /86   Pulse 93   Ht 5' 4\" (1.626 m)   Wt 81 kg (178 lb 9.2 oz)   LMP 08/31/2017   SpO2 99%   BMI 30.65 kg/m²       HISTORY OF PRESENT ILLNESS     Dior Ann is a 61yo female who presents today with sciatica on right x 1 mo. She was swimming (freestyle, breast stroke, back stroke) a lot which she attributed to this (flip turns etc), then it improved, 2 weeks later it came back, she was stretching, improving, last week it seemed to come back. Pain is intense, when pain is intense it really impacts her sleep. She thinks this most recent aggravation is from moving a lot of garden dirt. Improved by sitting, stretching. Standing bothers it.     Pain feels like a dull ache in her calf, sharp pain in her buttocks. She doesn't think she's dehydrated. Denies loss of bowel or bladder control, numbness/tingling.     January got a more sedentary job, which may be contributing.       Other significant problems:  Patient Active Problem List    Diagnosis Date Noted   • History of 2019 novel coronavirus disease (COVID-19) 08/06/2021     Priority: Low   • Hypothyroidism, adult 01/22/2016     Priority: Low       PAST MEDICAL, FAMILY AND SOCIAL HISTORY     Medications:  Current Outpatient Medications   Medication Sig Dispense Refill   • levothyroxine 100 MCG tablet Take 1 tablet by mouth daily. 90 tablet 0   • Multiple Vitamins-Minerals (MULTIVITAMIN PO) Take  by mouth daily.       No current facility-administered medications for this visit.       Allergies:  ALLERGIES:   Allergen Reactions   • Cat Dander Other (See Comments)     Cholinergic     • Seasonal Other (See Comments)     Cholinergic         Past Medical  History/Surgeries:  Past Medical History:   Diagnosis Date   • Allergy     cats, seasonal   • History of skull fracture 1982   • Hypothyroidism 2012   • Multiple  First available is Jan 10th at 11AM.    Can put him on cancellation list also.    Thanks!   thyroid nodules    • Uterine fibroid 2012       Past Surgical History:   Procedure Laterality Date   • Biopsy of thyroid,trey  2015    1/3/18       Family History:  Family History   Problem Relation Age of Onset   • Cancer Mother 70        thyroid CA, covid    • Heart disease Mother         valves   • Other Father         MVC, lyme disease   • Heart disease Father         thought to be due to lymes   • Thyroid Sister    • Thyroid Sister         nodules   • NEGATIVE FAMILY HX OF Brother    • Stroke Maternal Aunt    • Cancer Maternal Aunt    • Stroke Maternal Uncle    • Stroke Maternal Grandmother    • Heart disease Paternal Grandmother    • Heart disease Paternal Grandfather        Social History:  Social History     Tobacco Use   • Smoking status: Never   • Smokeless tobacco: Never   Substance Use Topics   • Alcohol use: Yes     Comment: rare       REVIEW OF SYSTEMS     Review of Systems   All other systems reviewed and are negative.      PHYSICAL EXAM     Physical Exam  Vitals and nursing note reviewed.   Constitutional:       General: She is not in acute distress.     Appearance: Normal appearance. She is not ill-appearing.   HENT:      Head: Normocephalic and atraumatic.   Cardiovascular:      Rate and Rhythm: Normal rate and regular rhythm.      Heart sounds: Normal heart sounds. No murmur heard.  Pulmonary:      Effort: Pulmonary effort is normal. No respiratory distress.      Breath sounds: Normal breath sounds. No stridor.   Musculoskeletal:      Comments: Bilateral hips - full ROM, no pain with hip rotation. Full strength bilaterally. Some hypertonicity in sciatica area bilaterally.    Legs: no popliteal cysts bilaterally, there is some hypertonicity in lateral gastrocnemius in right leg.    Skin:     General: Skin is warm and dry.      Coloration: Skin is not jaundiced or pale.   Neurological:      General: No focal deficit present.      Mental Status: She is alert and oriented to person, place, and time.       Cranial Nerves: No cranial nerve deficit.      Sensory: No sensory deficit.   Psychiatric:         Mood and Affect: Mood normal.         Behavior: Behavior normal.         Thought Content: Thought content normal.         Judgment: Judgment normal.       ASSESSMENT/PLAN     ***

## 2024-05-13 ENCOUNTER — OFFICE VISIT (OUTPATIENT)
Dept: CARDIOLOGY | Facility: CLINIC | Age: 64
End: 2024-05-13
Payer: COMMERCIAL

## 2024-05-13 VITALS
OXYGEN SATURATION: 98 % | BODY MASS INDEX: 44.1 KG/M2 | DIASTOLIC BLOOD PRESSURE: 70 MMHG | WEIGHT: 281 LBS | HEIGHT: 67 IN | HEART RATE: 79 BPM | SYSTOLIC BLOOD PRESSURE: 122 MMHG

## 2024-05-13 DIAGNOSIS — G47.33 OSA (OBSTRUCTIVE SLEEP APNEA): ICD-10-CM

## 2024-05-13 DIAGNOSIS — I48.19 ATRIAL FIBRILLATION, PERSISTENT: Primary | ICD-10-CM

## 2024-05-13 DIAGNOSIS — Z79.01 CHRONIC ANTICOAGULATION: ICD-10-CM

## 2024-05-13 DIAGNOSIS — I10 ESSENTIAL HYPERTENSION: ICD-10-CM

## 2024-05-13 DIAGNOSIS — I48.0 PAROXYSMAL ATRIAL FIBRILLATION: ICD-10-CM

## 2024-05-13 NOTE — PROGRESS NOTES
Date of Office Visit: 2024  Encounter Provider: Ange Hernandez MD  Place of Service: [facility]  Patient Name: Danyel Robertson  :1960    Chief complaint  Persistent/chronic atrial fibrillation    History of Present Illness  Patient is 63-year-old gentleman history of hypertension, reflux disease, dyslipidemia, obstructive sleep apnea (untreated): 2020 was noted to have atrial fibrillation.  He was treated with Lopressor and Eliquis.  Echo showed an ejection fraction 64%, mildly dilated left atrium no significant valvular heart disease.  On  a stress perfusion study was negative for ischemia.  Echo 2023 with ejection fraction 60%, moderate left ventricular hypertrophy, indeterminate diastolic function with moderate severe left atrial enlargement with normal right-sided pressures.  On 2024 he was seen by Dr. Pearl who felt that after review patient would not be a good candidate for rhythm management given the large atrial size alcohol consumption and untreated sleep apnea.    Since last visit patient's had no chest pain dizziness or shortness of breath.  He has had palpitations that he thinks occurs with pressure changes in the environment.  They are quite sporadic may last 2 to 3 hours at a time he usually will take an extra metoprolol.  This occurs once or twice a month.  He has occasional dependent edema.  Past Medical History:   Diagnosis Date    Arthritis     GERD (gastroesophageal reflux disease)     Hypertension     New onset atrial fibrillation 2020    Seasonal allergies      Past Surgical History:   Procedure Laterality Date    KNEE ARTHROSCOPY W/ MENISCAL REPAIR Left 10/2011    WISDOM TOOTH EXTRACTION       Outpatient Medications Prior to Visit   Medication Sig Dispense Refill    amLODIPine-benazepril (LOTREL) 10-20 MG per capsule TAKE 1 CAPSULE BY MOUTH EVERY DAY 90 capsule 1    apixaban (Eliquis) 5 MG tablet tablet Take 1 tablet by  mouth 2 (Two) Times a Day. 180 tablet 3    chlorpheniramine (CHLOR-TRIMETON) 4 MG tablet Take 1 tablet by mouth Every 6 (Six) Hours As Needed for Allergies.      Diclofenac Sodium (VOLTAREN) 1 % gel gel 400 Gram, APPLY 4G TOPICALLY FOUR TIMES DAILY MAX 16G/JOINT/DAY OR 32G TOTAL, 0 Refill(s)      diphenhydrAMINE (BENADRYL) 25 mg capsule Take 1 capsule by mouth Daily.      famotidine (PEPCID) 40 MG tablet Take 1 tablet by mouth Daily As Needed.      fluticasone (FLONASE) 50 MCG/ACT nasal spray 1 spray into the nostril(s) as directed by provider Daily.      furosemide (LASIX) 20 MG tablet Take 1 tablet by mouth 2 (Two) Times a Day.      GUAIFENESIN PO Take 40 mg by mouth Daily.      hydrocortisone butyrate (LOCOID) 0.1 % ointment ointment Apply  topically to the appropriate area as directed 2 (Two) Times a Day As Needed.      metaxalone (SKELAXIN) 800 MG tablet Take 1 tablet by mouth. Usually takes 1-2 times daily as needed      metoprolol tartrate (LOPRESSOR) 100 MG tablet TAKE 1 TABLET BY MOUTH TWICE A  tablet 1    metoprolol tartrate (LOPRESSOR) 50 MG tablet Take 1 tablet by mouth Daily As Needed (palpitations). Take 50 mg daily as needed for palpitations as long as SBP >110      montelukast (SINGULAIR) 10 MG tablet Take 1 tablet by mouth Daily.      omega-3 acid ethyl esters (LOVAZA) 1 g capsule TAKE 2 CAPSULES BY MOUTH 2 (TWO) TIMES DAILY.      vardenafil (LEVITRA) 20 MG tablet Take 1 tablet by mouth Daily As Needed for Erectile Dysfunction.       No facility-administered medications prior to visit.       Allergies as of 05/13/2024 - Reviewed 05/13/2024   Allergen Reaction Noted    Oxycodone-acetaminophen Unknown - Low Severity 06/29/2023    Procaine Itching and Swelling 09/30/2014    Oxycodone Rash 09/30/2014    Penicillins Rash 09/30/2014     Social History     Socioeconomic History    Marital status:    Tobacco Use    Smoking status: Never    Smokeless tobacco: Never   Vaping Use    Vaping  "status: Never Used   Substance and Sexual Activity    Alcohol use: Yes     Comment: socialCaffeine use: None    Drug use: Never    Sexual activity: Yes     Partners: Female     Family History   Problem Relation Age of Onset    Heart disease Mother         VSD    Other Father         shy drager syndrome    Diabetes Sister      Review of Systems   Constitutional: Negative for chills, fever, weight gain and weight loss.   Cardiovascular:  Positive for leg swelling and palpitations.   Respiratory:  Negative for cough, snoring and wheezing.    Hematologic/Lymphatic: Negative for bleeding problem. Does not bruise/bleed easily.   Skin:  Negative for color change.   Musculoskeletal:  Positive for joint pain. Negative for falls and myalgias.   Gastrointestinal:  Negative for melena.   Genitourinary:  Negative for hematuria.   Neurological:  Negative for excessive daytime sleepiness.   Psychiatric/Behavioral:  Negative for depression. The patient is not nervous/anxious.         Objective:     Vitals:    05/13/24 1106   BP: 122/70   Pulse: 79   SpO2: 98%   Weight: 127 kg (281 lb)   Height: 170.2 cm (67\")     Body mass index is 44.01 kg/m².    Vitals reviewed.   Constitutional:       Appearance: Well-developed. Morbidly obese.   Eyes:      General: No scleral icterus.        Right eye: No discharge.      Conjunctiva/sclera: Conjunctivae normal.      Pupils: Pupils are equal, round, and reactive to light.   HENT:      Head: Normocephalic.      Nose: Nose normal.   Neck:      Thyroid: No thyromegaly.      Vascular: No JVD.   Pulmonary:      Effort: Pulmonary effort is normal. No respiratory distress.      Breath sounds: Normal breath sounds. No wheezing. No rales.   Cardiovascular:      Normal rate. Irregularly irregular rhythm. Normal S1. Normal S2.       Murmurs: There is no murmur.      No gallop.    Pulses:     Intact distal pulses.      Carotid: 2+ bilaterally.     Radial: 2+ bilaterally.     Femoral: 2+ bilaterally.     " Popliteal: 2+ bilaterally.     Dorsalis pedis: 2+ bilaterally.     Posterior tibial: 2+ bilaterally.  Edema:     Peripheral edema present.     Pretibial: bilateral trace edema of the pretibial area.     Ankle: bilateral trace edema of the ankle.  Abdominal:      General: Bowel sounds are normal. There is no distension.      Palpations: Abdomen is soft.      Tenderness: There is no abdominal tenderness. There is no rebound.   Musculoskeletal: Normal range of motion.         General: No tenderness.      Cervical back: Normal range of motion and neck supple. Skin:     General: Skin is warm and dry.      Findings: No erythema or rash.   Neurological:      Mental Status: Alert and oriented to person, place, and time.   Psychiatric:         Behavior: Behavior normal.         Thought Content: Thought content normal.         Judgment: Judgment normal.       Lab Review:   Lab Results - Last 18 Months   Lab Units 11/20/23  0949 06/24/23  1511 05/15/23  1000   WBC 10*3/uL 12.12*  --  10.92   RBC 10*6/uL 5.71  --  5.10   HEMOGLOBIN g/dL 17.4  --  15.6   HEMATOCRIT % 52.4  --  46.9   MCV fL 91.8  --  92.0   MCH pg 30.5  --  30.6   MCHC g/dL 33.2  --  33.3   RDW % 13.4  --  13.7   PLATELETS 10*3/uL 173  --  165   NEUTROPHIL % % 75.2  --  73.3   LYMPHOCYTE % % 13.6*  --  15.0   MONOCYTES % % 7.3  --  8.0   EOSINOPHIL % % 1.7  --  1.6   BASOPHIL % % 1.0  --  1.2   NEUTROS ABS 10*3/uL 9.12* 9.4* 8.00   LYMPHS ABS 10*3/uL 1.65  --  1.64   MONOS ABS 10*3/uL 0.88  --  0.87   EOS ABS 10*3/uL 0.20 0.2 0.18   BASOS ABS 10*3/uL 0.12 0.1 0.13   MPV fL 9.6  --  9.5       Lab Results - Last 18 Months   Lab Units 06/24/23  1511   PROTIME Second 15.6*   APTT Second 34.0         ECG 12 Lead    Date/Time: 5/14/2024 8:51 PM  Performed by: Ange Hernandez MD    Authorized by: Ange Hernandez MD  Comparison: compared with previous ECG   Similar to previous ECG  Comparison to previous ECG: Nonspecific ST-T wave changes are noted in the inferior  leads  Rhythm: atrial fibrillation  Other findings: non-specific ST-T wave changes    Clinical impression: abnormal EKG        Assessment:       Diagnosis Plan   1. Atrial fibrillation, persistent  ECG 12 Lead      2. Chronic anticoagulation        3. Essential hypertension        4. Paroxysmal atrial fibrillation        5. RACH (obstructive sleep apnea)          Plan:       1.  Persistent/paroxysmal atrial fibrillation.  Residual palpitations for which he takes a day additional 1 tablet of metoprolol as needed once or twice a month.  Continue with this approach this time.  Remains on anticoagulant therapy without bleeds or falls  2.  Chronic anticoagulant use, Eliquis  3.  Hypertension.  Controlled  4.  Edema.  Minimal on exam today.  He will try to keep his legs elevated.  5.  Untreated sleep apnea.    6.  Dyslipidemia  7.  Obesity      Time Spent: I spent 25 minutes caring for Danyel on this date of service. This time includes time spent by me in the following activities: preparing for the visit, reviewing tests, obtaining and/or reviewing a separately obtained history, performing a medically appropriate examination and/or evaluation, counseling and educating the patient/family/caregiver, documenting information in the medical record, and independently interpreting results and communicating that information with the patient/family/caregiver.   I spent 1 minutes on the separately reported service of ECG. This time is not included in the time used to support the E/M service also reported today.        Your medication list            Accurate as of May 13, 2024 11:59 PM. If you have any questions, ask your nurse or doctor.                CONTINUE taking these medications        Instructions Last Dose Given Next Dose Due   amLODIPine-benazepril 10-20 MG per capsule  Commonly known as: LOTREL      TAKE 1 CAPSULE BY MOUTH EVERY DAY       apixaban 5 MG tablet tablet  Commonly known as: Eliquis      Take 1 tablet by mouth  2 (Two) Times a Day.       chlorpheniramine 4 MG tablet  Commonly known as: CHLOR-TRIMETON      Take 1 tablet by mouth Every 6 (Six) Hours As Needed for Allergies.       Diclofenac Sodium 1 % gel gel  Commonly known as: VOLTAREN      400 Gram, APPLY 4G TOPICALLY FOUR TIMES DAILY MAX 16G/JOINT/DAY OR 32G TOTAL, 0 Refill(s)       diphenhydrAMINE 25 mg capsule  Commonly known as: BENADRYL      Take 1 capsule by mouth Daily.       famotidine 40 MG tablet  Commonly known as: PEPCID      Take 1 tablet by mouth Daily As Needed.       fluticasone 50 MCG/ACT nasal spray  Commonly known as: FLONASE      1 spray into the nostril(s) as directed by provider Daily.       furosemide 20 MG tablet  Commonly known as: LASIX      Take 1 tablet by mouth 2 (Two) Times a Day.       GUAIFENESIN PO      Take 40 mg by mouth Daily.       hydrocortisone butyrate 0.1 % ointment ointment  Commonly known as: LOCOID      Apply  topically to the appropriate area as directed 2 (Two) Times a Day As Needed.       metaxalone 800 MG tablet  Commonly known as: SKELAXIN      Take 1 tablet by mouth. Usually takes 1-2 times daily as needed       metoprolol tartrate 50 MG tablet  Commonly known as: LOPRESSOR      Take 1 tablet by mouth Daily As Needed (palpitations). Take 50 mg daily as needed for palpitations as long as SBP >110       metoprolol tartrate 100 MG tablet  Commonly known as: LOPRESSOR      TAKE 1 TABLET BY MOUTH TWICE A DAY       montelukast 10 MG tablet  Commonly known as: SINGULAIR      Take 1 tablet by mouth Daily.       omega-3 acid ethyl esters 1 g capsule  Commonly known as: LOVAZA      TAKE 2 CAPSULES BY MOUTH 2 (TWO) TIMES DAILY.       vardenafil 20 MG tablet  Commonly known as: LEVITRA      Take 1 tablet by mouth Daily As Needed for Erectile Dysfunction.                Patient is no longer taking -.  I corrected the med list to reflect this.  I did not stop these medications.      Dictated utilizing Dragon dictation

## 2024-09-05 DIAGNOSIS — R00.2 PALPITATIONS: ICD-10-CM

## 2024-09-05 RX ORDER — METOPROLOL TARTRATE 100 MG
TABLET ORAL
Qty: 180 TABLET | Refills: 1 | Status: SHIPPED | OUTPATIENT
Start: 2024-09-05

## 2024-09-06 RX ORDER — APIXABAN 5 MG/1
5 TABLET, FILM COATED ORAL 2 TIMES DAILY
Qty: 180 TABLET | Refills: 3 | Status: SHIPPED | OUTPATIENT
Start: 2024-09-06

## 2024-11-13 RX ORDER — AMLODIPINE AND BENAZEPRIL HYDROCHLORIDE 10; 20 MG/1; MG/1
CAPSULE ORAL
Qty: 90 CAPSULE | Refills: 1 | Status: SHIPPED | OUTPATIENT
Start: 2024-11-13

## 2024-11-13 NOTE — TELEPHONE ENCOUNTER
Protocol Failed.     NOV 11/25/2024 ([place])  LOV 5/13/2024 (MD)    Plan:       1.  Persistent/paroxysmal atrial fibrillation.  Residual palpitations for which he takes a day additional 1 tablet of metoprolol as needed once or twice a month.  Continue with this approach this time.  Remains on anticoagulant therapy without bleeds or falls  2.  Chronic anticoagulant use, Eliquis  3.  Hypertension.  Controlled  4.  Edema.  Minimal on exam today.  He will try to keep his legs elevated.  5.  Untreated sleep apnea.    6.  Dyslipidemia  7.  Obesity        Time Spent: I spent 25 minutes caring for Danyel on this date of service. This time includes time spent by me in the following activities: preparing for the visit, reviewing tests, obtaining and/or reviewing a separately obtained history, performing a medically appropriate examination and/or evaluation, counseling and educating the patient/family/caregiver, documenting information in the medical record, and independently interpreting results and communicating that information with the patient/family/caregiver.   I spent 1 minutes on the separately reported service of ECG. This time is not included in the time used to support the E/M service also reported today.

## 2024-11-22 NOTE — PROGRESS NOTES
Date of Office Visit: 2024  Encounter Provider: JODI Trivedi  Place of Service: [facility]  Patient Name: Danyel Robertson  :1960    Chief complaint  Persistent/chronic atrial fibrillation     History of Present Illness  Patient is a 64 y.o. year old male  patient of Dr. Hernandez. Past medical history includes hypertension, reflux disease, dyslipidemia, obstructive sleep apnea (untreated): 2020 was noted to have atrial fibrillation.  He was treated with Lopressor and Eliquis.  Echo showed an ejection fraction 64%, mildly dilated left atrium no significant valvular heart disease.  On  a stress perfusion study was negative for ischemia.  Echo 2023 with ejection fraction 60%, moderate left ventricular hypertrophy, indeterminate diastolic function with moderate severe left atrial enlargement with normal right-sided pressures.  On 2024 he was seen by Dr. Pearl who felt that after review patient would not be a good candidate for rhythm management given the large atrial size, alcohol consumption, and untreated sleep apnea.     Interval history  Patient presents today for routine follow-up.  I will visit with him today and have reviewed his medical record.  Since last visit he continues to leave sleep apnea untreated.  Inspire device has been discussed but he has chosen not to pursue this at this time.  He continues to have palpitations that occur 3-4 times per month.  On occasion these will occur in the middle of the night.  He drinks a minimal amount of caffeine but has 2 alcoholic beverages per night and has also been eating chocolate recently (Halloween candy).  He reports that episodes of palpitations are not sustained and resolve quickly with extra metoprolol dose.  These have overall been stable.  He has chronic lower extremity edema that is well-controlled on current furosemide dose.  He also wears compression stockings.  He believes he is  following a low-salt diet.  He has been struggling with sciatica recently as well as left knee pain and is participating in physical therapy.  There are plans for possible left knee replacement next year. He is tolerating Eliquis well with no falls or abnormal bleeding.    Past Medical History:   Diagnosis Date    Arthritis     GERD (gastroesophageal reflux disease)     Hypertension     New onset atrial fibrillation 11/09/2020    Seasonal allergies      Past Surgical History:   Procedure Laterality Date    KNEE ARTHROSCOPY W/ MENISCAL REPAIR Left 10/2011    WISDOM TOOTH EXTRACTION       Outpatient Medications Prior to Visit   Medication Sig Dispense Refill    amLODIPine-benazepril (LOTREL) 10-20 MG per capsule TAKE 1 CAPSULE BY MOUTH EVERY DAY 90 capsule 1    chlorpheniramine (CHLOR-TRIMETON) 4 MG tablet Take 1 tablet by mouth Every 6 (Six) Hours As Needed for Allergies.      Diclofenac Sodium (VOLTAREN) 1 % gel gel 400 Gram, APPLY 4G TOPICALLY FOUR TIMES DAILY MAX 16G/JOINT/DAY OR 32G TOTAL, 0 Refill(s)      diphenhydrAMINE (BENADRYL) 25 mg capsule Take 1 capsule by mouth Daily.      Eliquis 5 MG tablet tablet TAKE 1 TABLET BY MOUTH TWICE A  tablet 3    famotidine (PEPCID) 40 MG tablet Take 1 tablet by mouth Daily As Needed.      fluticasone (FLONASE) 50 MCG/ACT nasal spray Administer 1 spray into the nostril(s) as directed by provider Daily.      furosemide (LASIX) 20 MG tablet Take 1 tablet by mouth 2 (Two) Times a Day.      GUAIFENESIN PO Take 40 mg by mouth Daily.      hydrocortisone butyrate (LOCOID) 0.1 % ointment ointment Apply  topically to the appropriate area as directed 2 (Two) Times a Day As Needed.      metaxalone (SKELAXIN) 800 MG tablet Take 1 tablet by mouth. Usually takes 1-2 times daily as needed      metoprolol tartrate (LOPRESSOR) 100 MG tablet TAKE 1 TABLET BY MOUTH TWICE A  tablet 1    metoprolol tartrate (LOPRESSOR) 50 MG tablet Take 1 tablet by mouth Daily As Needed  "(palpitations). Take 50 mg daily as needed for palpitations as long as SBP >110      montelukast (SINGULAIR) 10 MG tablet Take 1 tablet by mouth Daily.      omega-3 acid ethyl esters (LOVAZA) 1 g capsule TAKE 2 CAPSULES BY MOUTH 2 (TWO) TIMES DAILY.      vardenafil (LEVITRA) 20 MG tablet Take 1 tablet by mouth Daily As Needed for Erectile Dysfunction.       No facility-administered medications prior to visit.       Allergies as of 11/25/2024 - Reviewed 11/25/2024   Allergen Reaction Noted    Oxycodone-acetaminophen Unknown - Low Severity 06/29/2023    Procaine Itching and Swelling 09/30/2014    Oxycodone Rash 09/30/2014    Penicillins Rash 09/30/2014     Social History     Socioeconomic History    Marital status:    Tobacco Use    Smoking status: Never     Passive exposure: Never    Smokeless tobacco: Never   Vaping Use    Vaping status: Never Used   Substance and Sexual Activity    Alcohol use: Yes     Comment: socialCaffeine use: None    Drug use: Never    Sexual activity: Yes     Partners: Female     Family History   Problem Relation Age of Onset    Heart disease Mother         VSD    Other Father         shy drager syndrome    Diabetes Sister      Review of Systems   Constitutional: Negative for malaise/fatigue.   Cardiovascular:  Positive for leg swelling and palpitations. Negative for chest pain, claudication, dyspnea on exertion, near-syncope, orthopnea, paroxysmal nocturnal dyspnea and syncope.   Respiratory:  Negative for shortness of breath.    Neurological:  Negative for brief paralysis, dizziness, headaches and light-headedness.   All other systems reviewed and are negative.       Objective:     Vitals:    11/25/24 0938   BP: 120/82   BP Location: Left arm   Patient Position: Sitting   Cuff Size: Adult   Pulse: 79   SpO2: 98%   Weight: 129 kg (284 lb)   Height: 170.2 cm (67\")     Body mass index is 44.48 kg/m².    Vitals reviewed.   Constitutional:       General: Not in acute distress.     " Appearance: Well-developed and not in distress. Not diaphoretic.   HENT:      Head: Normocephalic.   Pulmonary:      Effort: Pulmonary effort is normal. No respiratory distress.      Breath sounds: Normal breath sounds. No wheezing. No rhonchi. No rales.   Cardiovascular:      Normal rate. Irregularly irregular rhythm.      Murmurs: There is no murmur.   Pulses:     Radial: 2+ bilaterally.  Edema:     Peripheral edema present.     Ankle: bilateral trace edema of the ankle.     Feet: bilateral trace edema of the feet.  Skin:     General: Skin is warm and dry. There is no cyanosis.      Findings: No rash.   Neurological:      Mental Status: Alert and oriented to person, place, and time.   Psychiatric:         Behavior: Behavior normal. Behavior is cooperative.         Thought Content: Thought content normal.         Judgment: Judgment normal.       Lab Review:     Lab Results   Component Value Date     11/07/2022     05/09/2022    K 4.5 11/07/2022    K 4.1 05/09/2022    CL 98 11/07/2022     05/09/2022    CO2 24 11/07/2022    CO2 24 05/09/2022    BUN 14 11/07/2022    BUN 12 05/09/2022    CREATININE 0.89 11/07/2022    CREATININE 0.75 05/09/2022    EGFRIFNONA 94 11/09/2020    EGFRIFAFRI >60 11/07/2022    EGFRIFAFRI 113 11/09/2020    GLUCOSE 114 (H) 11/09/2020    CALCIUM 9.3 11/07/2022    CALCIUM 8.7 05/09/2022    ALBUMIN 4.5 11/07/2022    ALBUMIN 4.5 05/09/2022    BILITOT 0.9 11/07/2022    BILITOT 0.8 05/09/2022    AST 31 11/07/2022    AST 25 05/09/2022    ALT 44 11/07/2022    ALT 33 05/09/2022     Lab Results   Component Value Date    WBC 12.12 (H) 11/20/2023    WBC 10.92 05/15/2023    HGB 17.4 11/20/2023    HGB 15.6 05/15/2023    HCT 52.4 11/20/2023    HCT 46.9 05/15/2023    MCV 91.8 11/20/2023    MCV 92.0 05/15/2023     11/20/2023     05/15/2023     Lab Results   Component Value Date    PROBNP 49.5 11/09/2020     (H) 06/24/2023    .6 (H) 05/15/2023     Lab Results    Component Value Date    TROPONINT <0.010 11/09/2020     Lab Results   Component Value Date    TSH 3.680 11/08/2021    TSH 2.590 11/09/2020             ECG 12 Lead    Date/Time: 11/25/2024 11:16 AM  Performed by: Aleida Mc APRN    Authorized by: Aleida Mc APRN  Comparison: compared with previous ECG   Similar to previous ECG  Rhythm: atrial fibrillation  Rate: normal  BPM: 79  QRS axis: normal  Comments: Similar to prior        Assessment:       Diagnosis Plan   1. Atrial fibrillation, persistent        2. Chronic anticoagulation        3. Essential hypertension        4. RACH (obstructive sleep apnea)        5. Edema leg        6. Dyslipidemia          Plan:       1.  Persistent/paroxysmal atrial fibrillation.  Mild residual palpitations likely worsened by alcohol and chocolate.  These are well-controlled with current regimen of Lopressor 100 mg twice daily with an extra dose as needed for palpitations.  He requires this extra dose about once per week which is stable for him.  2.  Chronic anticoagulant use, Eliquis  3.  Hypertension.  Controlled on current regimen  4.  Edema.  Minimal on exam today.  He will try to keep his legs elevated.  5.  Untreated sleep apnea.  Discussed consequences of untreated sleep apnea again with him today including risk of palpitations, LV dysfunction, pulmonary and renal complications.  Discussed the possibility of inspire device.  6.  Dyslipidemia managed by PCP.  On Lovaza.  Not at goal on most recent check  7.  Obesity  8.  Possibility of orthopedic surgery.  He is currently struggling with sciatica as well as left knee pain.  He is to meet with orthopedics and neurosurgery.  Discussed that prior to surgical procedure would recommend repeat stress test as his last ischemic evaluation was in 2020.  He would prefer to wait to schedule this after he has discussed with surgical team.      Time Spent: I spent 30 minutes caring for Danyel on this date of service. This  time includes time spent by me in the following activities: preparing for the visit, reviewing tests, performing a medically appropriate examination and/or evaluations, counseling and educating the patient/family/caregiver, ordering medications, tests, or procedures, documenting information in the medical record, and independently interpreting results and communicating that information with the patient/family/caregiver.   I spent 1 minutes on the separately reported service of ECG. This time is not included in the time used to support the E/M service also reported today.        Your medication list            Accurate as of November 25, 2024 11:18 AM. If you have any questions, ask your nurse or doctor.                CONTINUE taking these medications        Instructions Last Dose Given Next Dose Due   amLODIPine-benazepril 10-20 MG per capsule  Commonly known as: LOTREL      TAKE 1 CAPSULE BY MOUTH EVERY DAY       chlorpheniramine 4 MG tablet  Commonly known as: CHLOR-TRIMETON      Take 1 tablet by mouth Every 6 (Six) Hours As Needed for Allergies.       Diclofenac Sodium 1 % gel gel  Commonly known as: VOLTAREN      400 Gram, APPLY 4G TOPICALLY FOUR TIMES DAILY MAX 16G/JOINT/DAY OR 32G TOTAL, 0 Refill(s)       diphenhydrAMINE 25 mg capsule  Commonly known as: BENADRYL      Take 1 capsule by mouth Daily.       Eliquis 5 MG tablet tablet  Generic drug: apixaban      TAKE 1 TABLET BY MOUTH TWICE A DAY       famotidine 40 MG tablet  Commonly known as: PEPCID      Take 1 tablet by mouth Daily As Needed.       fluticasone 50 MCG/ACT nasal spray  Commonly known as: FLONASE      Administer 1 spray into the nostril(s) as directed by provider Daily.       furosemide 20 MG tablet  Commonly known as: LASIX      Take 1 tablet by mouth 2 (Two) Times a Day.       GUAIFENESIN PO      Take 40 mg by mouth Daily.       hydrocortisone butyrate 0.1 % ointment ointment  Commonly known as: LOCOID      Apply  topically to the appropriate  area as directed 2 (Two) Times a Day As Needed.       metaxalone 800 MG tablet  Commonly known as: SKELAXIN      Take 1 tablet by mouth. Usually takes 1-2 times daily as needed       metoprolol tartrate 50 MG tablet  Commonly known as: LOPRESSOR      Take 1 tablet by mouth Daily As Needed (palpitations). Take 50 mg daily as needed for palpitations as long as SBP >110       metoprolol tartrate 100 MG tablet  Commonly known as: LOPRESSOR      TAKE 1 TABLET BY MOUTH TWICE A DAY       montelukast 10 MG tablet  Commonly known as: SINGULAIR      Take 1 tablet by mouth Daily.       omega-3 acid ethyl esters 1 g capsule  Commonly known as: LOVAZA      TAKE 2 CAPSULES BY MOUTH 2 (TWO) TIMES DAILY.       vardenafil 20 MG tablet  Commonly known as: LEVITRA      Take 1 tablet by mouth Daily As Needed for Erectile Dysfunction.                Patient is no longer taking -.  I corrected the med list to reflect this.  I did not stop these medications.    Return in about 6 months (around 5/25/2025) for with Dr. Hernandez.      Dictated utilizing Dragon dictation

## 2024-11-25 ENCOUNTER — OFFICE VISIT (OUTPATIENT)
Dept: CARDIOLOGY | Facility: CLINIC | Age: 64
End: 2024-11-25
Payer: COMMERCIAL

## 2024-11-25 VITALS
DIASTOLIC BLOOD PRESSURE: 82 MMHG | HEART RATE: 79 BPM | OXYGEN SATURATION: 98 % | WEIGHT: 284 LBS | BODY MASS INDEX: 44.57 KG/M2 | SYSTOLIC BLOOD PRESSURE: 120 MMHG | HEIGHT: 67 IN

## 2024-11-25 DIAGNOSIS — R60.0 EDEMA LEG: ICD-10-CM

## 2024-11-25 DIAGNOSIS — G47.33 OSA (OBSTRUCTIVE SLEEP APNEA): ICD-10-CM

## 2024-11-25 DIAGNOSIS — I10 ESSENTIAL HYPERTENSION: ICD-10-CM

## 2024-11-25 DIAGNOSIS — I48.19 ATRIAL FIBRILLATION, PERSISTENT: Primary | ICD-10-CM

## 2024-11-25 DIAGNOSIS — E78.5 DYSLIPIDEMIA: ICD-10-CM

## 2024-11-25 DIAGNOSIS — Z79.01 CHRONIC ANTICOAGULATION: ICD-10-CM

## 2025-01-16 ENCOUNTER — TELEPHONE (OUTPATIENT)
Dept: CARDIOLOGY | Facility: CLINIC | Age: 65
End: 2025-01-16
Payer: COMMERCIAL

## 2025-01-16 NOTE — TELEPHONE ENCOUNTER
Received a fax from [facility] re: Epidural.  They want the pt to hold Eliquis 3 days prior and restart 24 hours after.    + Palpitations (1-2 times a week.  Which he said is the same.  Will take an extra Metoprolol on those days.)  No SOA  No chest pain

## 2025-01-16 NOTE — TELEPHONE ENCOUNTER
As long as he understands the risk of stroke off anticoagulation, would be fine to hold Eliquis as directed by procedural team. Would also recommend avoiding alcohol and stimulants (caffeine and chocolate) for 2 weeks prior to procedure to reduce the risk of rapid atrial fibrillation.

## 2025-01-17 NOTE — TELEPHONE ENCOUNTER
Spoke with pt.  Verbalized understanding and risk.  He will avoid alcohol and stimulants.  He will go to the ER for any stroke-like sx while off the AC.      We will have Clearance signed on Monday and fax when Aleida is back.

## 2025-04-04 DIAGNOSIS — R00.2 PALPITATIONS: ICD-10-CM

## 2025-04-07 RX ORDER — METOPROLOL TARTRATE 100 MG/1
100 TABLET ORAL 2 TIMES DAILY
Qty: 180 TABLET | Refills: 1 | Status: SHIPPED | OUTPATIENT
Start: 2025-04-07

## 2025-04-07 NOTE — TELEPHONE ENCOUNTER
Lv 11/25/24 sw   Nxt 6/2/25 mkd   Labs 11/18/24    Return in about 6 months (around 5/25/2025) for with Dr. Hernandez.

## 2025-05-12 RX ORDER — AMLODIPINE AND BENAZEPRIL HYDROCHLORIDE 10; 20 MG/1; MG/1
1 CAPSULE ORAL DAILY
Qty: 90 CAPSULE | Refills: 1 | Status: SHIPPED | OUTPATIENT
Start: 2025-05-12

## 2025-05-19 ENCOUNTER — TELEPHONE (OUTPATIENT)
Dept: CARDIOLOGY | Age: 65
End: 2025-05-19
Payer: COMMERCIAL

## 2025-05-19 NOTE — TELEPHONE ENCOUNTER
Received a call from Nelsy with [facility] @ [phone] re: Clearance for Colonoscopy scheduled 8/28/2025.  Pt is on [employer] and they would like this held 48 hours.      Fax: [phone]    Advised Nelsy that we have the pt coming in of 6/2/2025 and we will address at that time.  I set reminder.

## 2025-05-31 NOTE — ED PROVIDER NOTES
EMERGENCY DEPARTMENT ENCOUNTER    Room Number:  01/01  Date seen:  11/10/2020  Time seen: 18:50 EST  PCP: Kia Nelson MD  Historian: patient    HPI:  Chief complaint:palpitations, rapid HR  A complete HPI/ROS/PMH/PSH/SH/FH are unobtainable due to: n/a  Context:Danyel Robertson is a 60 y.o. male who presents to the ED with c/o acute onset of rapid heart rate and palpitations that started today at 1330.  It is not made better/worse by anything.  His wife states she got apple watch and it stated atrial fibrillation.  Pt has no history of atrial fib that is documented but states this has been happening on and off for the past year and when it starts it lasts for 1/2 day then stops.   States he feels it is usually brought on by anxiety.  States he has been glued to TV regarding politics lately.  He denies any chest pain/pressure or tightness and has no shortness of breath.  He has no recent medication changes or surgical procedures.  He actually had PCP visit today with his PCP and no medication changes were made.  He takes lotrel daily for BP.  He does not have a Cardiologist.      Patient was placed in face mask in first look. Patient was wearing facemask when I entered the room and throughout our encounter. I wore full protective equipment throughout this patient encounter including a face mask, eye shield and gloves. Hand hygiene/washing of hands was performed before donning protective equipment and after removal when leaving the room.    MEDICAL RECORD REVIEW    ALLERGIES  Patient has no known allergies.    PAST MEDICAL HISTORY  Active Ambulatory Problems     Diagnosis Date Noted   • No Active Ambulatory Problems     Resolved Ambulatory Problems     Diagnosis Date Noted   • No Resolved Ambulatory Problems     No Additional Past Medical History       PAST SURGICAL HISTORY  No past surgical history on file.    FAMILY HISTORY  No family history on file.    SOCIAL HISTORY  Social History     Socioeconomic History    • Marital status:      Spouse name: Not on file   • Number of children: Not on file   • Years of education: Not on file   • Highest education level: Not on file       REVIEW OF SYSTEMS  Review of Systems    All systems reviewed and negative except for those discussed in HPI.     PHYSICAL EXAM    ED Triage Vitals [11/09/20 1841]   Temp Heart Rate Resp BP SpO2   97.9 °F (36.6 °C) 69 20 -- 98 %      Temp src Heart Rate Source Patient Position BP Location FiO2 (%)   -- -- -- -- --     Physical Exam    I have reviewed the triage vital signs and nursing notes.      GENERAL: not distressed  HENT: nares patent, mm moist  EYES: no scleral icterus  NECK: no ROM limitations  CV: atrial fib with RVR noted on monitor, irregular rate and rhythm noted, no murmur, no rubs, no gallups  RESPIRATORY: normal effort, CTAB  ABDOMEN: soft, large and rounded.   : deferred  MUSCULOSKELETAL: no deformity  NEURO: alert, moves all extremities, follows commands  SKIN: warm, dry    LAB RESULTS  Recent Results (from the past 24 hour(s))   COMPREHENSIVE METABOLIC PANEL    Collection Time: 11/09/20  9:39 AM    Specimen: Fresh Frozen Plasma   Result Value Ref Range    Sodium 136 (L) 137 - 145 mmol/L    Potassium 4.8 3.5 - 5.1 mmol/L    Chloride 99 98 - 107 mmol/L    Total CO2 27 22 - 30 mmol/L    Glucose 104 (H) 74 - 99 mg/dL    BUN 19 7 - 20 mg/dL    Creatinine 0.7 0.7 - 1.5 mg/dL    BUN/Creatinine Ratio 27.0 (H) 10.0 - 20.0 RATIO    Est GFR by Clearance >60 >60 /1.73 m2    Total Protein 7.2 6.3 - 8.2 g/dL    Albumin 4.5 3.5 - 5.0 g/dL    Globulin 2.7 1.5 - 4.5 g/dL    A/G Ratio 1.7 1.1 - 2.5 RATIO    Calcium 9.6 8.4 - 10.2 mg/dL    Total Bilirubin 0.9 0.2 - 1.3 mg/dL    AST (SGOT) 32 15 - 46 U/L    ALT (SGPT) 27 13 - 69 U/L    Alkaline Phosphatase 85 38 - 126 U/L   HEMOGLOBIN A1C    Collection Time: 11/09/20  9:39 AM    Specimen type and source: Whole Blood,    Result Value Ref Range    Hemoglobin A1C 5.2 4.3 - 5.6 %    Mean Bld Glu  Estim. 103 mg/dL   PSA DIAGNOSTIC    Collection Time: 11/09/20  9:39 AM    Specimen: Fresh Frozen Plasma   Result Value Ref Range    PSA 1.59 0.00 - 4.00 ng/mL   TSH    Collection Time: 11/09/20  9:39 AM    Specimen: Fresh Frozen Plasma    Specimen type and source: Plasma, Blood   Result Value Ref Range    TSH 2.590 0.27 - 4.20 u(iU)/mL   ECG 12 Lead    Collection Time: 11/09/20  6:43 PM   Result Value Ref Range    QT Interval 288 ms   Light Blue Top    Collection Time: 11/09/20  6:49 PM   Result Value Ref Range    Extra Tube hold for add-on    Green Top (Gel)    Collection Time: 11/09/20  6:49 PM   Result Value Ref Range    Extra Tube Hold for add-ons.    Lavender Top    Collection Time: 11/09/20  6:49 PM   Result Value Ref Range    Extra Tube hold for add-on    Gold Top - SST    Collection Time: 11/09/20  6:49 PM   Result Value Ref Range    Extra Tube Hold for add-ons.    Comprehensive Metabolic Panel    Collection Time: 11/09/20  6:49 PM    Specimen: Blood   Result Value Ref Range    Glucose 114 (H) 65 - 99 mg/dL    BUN 19 6 - 20 mg/dL    Creatinine 0.84 0.76 - 1.27 mg/dL    Sodium 136 136 - 145 mmol/L    Potassium 3.8 3.5 - 5.2 mmol/L    Chloride 103 98 - 107 mmol/L    CO2 25.3 22.0 - 29.0 mmol/L    Calcium 9.1 8.6 - 10.5 mg/dL    Total Protein 6.8 6.0 - 8.5 g/dL    Albumin 4.40 3.50 - 5.20 g/dL    ALT (SGPT) 27 1 - 41 U/L    AST (SGOT) 23 1 - 40 U/L    Alkaline Phosphatase 87 39 - 117 U/L    Total Bilirubin 0.4 0.0 - 1.2 mg/dL    eGFR Non African Amer 94 >60 mL/min/1.73    eGFR  African Amer 113 >60 mL/min/1.73    Globulin 2.4 gm/dL    A/G Ratio 1.8 g/dL    BUN/Creatinine Ratio 22.6 7.0 - 25.0    Anion Gap 7.7 5.0 - 15.0 mmol/L   Protime-INR    Collection Time: 11/09/20  6:49 PM    Specimen: Blood   Result Value Ref Range    Protime 12.2 11.7 - 14.2 Seconds    INR 0.91 0.90 - 1.10   Lipase    Collection Time: 11/09/20  6:49 PM    Specimen: Blood   Result Value Ref Range    Lipase 18 13 - 60 U/L   BNP     Collection Time: 11/09/20  6:49 PM    Specimen: Blood   Result Value Ref Range    proBNP 49.5 0.0 - 900.0 pg/mL   Troponin    Collection Time: 11/09/20  6:49 PM    Specimen: Blood   Result Value Ref Range    Troponin T <0.010 0.000 - 0.030 ng/mL   CBC Auto Differential    Collection Time: 11/09/20  6:49 PM    Specimen: Blood   Result Value Ref Range    WBC 15.05 (H) 3.40 - 10.80 10*3/mm3    RBC 5.60 4.14 - 5.80 10*6/mm3    Hemoglobin 17.7 13.0 - 17.7 g/dL    Hematocrit 49.3 37.5 - 51.0 %    MCV 88.0 79.0 - 97.0 fL    MCH 31.6 26.6 - 33.0 pg    MCHC 35.9 (H) 31.5 - 35.7 g/dL    RDW 12.4 12.3 - 15.4 %    RDW-SD 39.7 37.0 - 54.0 fl    MPV 9.4 6.0 - 12.0 fL    Platelets 225 140 - 450 10*3/mm3    Neutrophil % 72.0 42.7 - 76.0 %    Lymphocyte % 17.1 (L) 19.6 - 45.3 %    Monocyte % 7.6 5.0 - 12.0 %    Eosinophil % 1.5 0.3 - 6.2 %    Basophil % 0.7 0.0 - 1.5 %    Immature Grans % 1.1 (H) 0.0 - 0.5 %    Neutrophils, Absolute 10.83 (H) 1.70 - 7.00 10*3/mm3    Lymphocytes, Absolute 2.58 0.70 - 3.10 10*3/mm3    Monocytes, Absolute 1.14 (H) 0.10 - 0.90 10*3/mm3    Eosinophils, Absolute 0.22 0.00 - 0.40 10*3/mm3    Basophils, Absolute 0.11 0.00 - 0.20 10*3/mm3    Immature Grans, Absolute 0.17 (H) 0.00 - 0.05 10*3/mm3    nRBC 0.0 0.0 - 0.2 /100 WBC   Urinalysis With Microscopic If Indicated (No Culture) - Urine, Clean Catch    Collection Time: 11/09/20  7:03 PM    Specimen: Urine, Clean Catch   Result Value Ref Range    Color, UA Yellow Yellow, Straw    Appearance, UA Clear Clear    pH, UA 6.0 5.0 - 8.0    Specific Gravity, UA 1.008 1.005 - 1.030    Glucose, UA Negative Negative    Ketones, UA Negative Negative    Bilirubin, UA Negative Negative    Blood, UA Negative Negative    Protein, UA Negative Negative    Leuk Esterase, UA Negative Negative    Nitrite, UA Negative Negative    Urobilinogen, UA 0.2 E.U./dL 0.2 - 1.0 E.U./dL   ECG 12 Lead    Collection Time: 11/09/20  8:39 PM   Result Value Ref Range    QT Interval 346 ms          RADIOLOGY RESULTS  XR Chest 1 View   Final Result   Mild cardiomegaly.       This report was finalized on 11/9/2020 7:38 PM by Dr. Rebel Sahni M.D.                PROGRESS, DATA ANALYSIS, CONSULTS AND MEDICAL DECISION MAKING  All labs have been independently reviewed by me.  All radiology studies have been reviewed by me and discussed with radiologist dictating the report.  EKG's independently viewed and interpreted by me unless stated otherwise. Discussion below represents my analysis of pertinent findings related to patient's condition, differential diagnosis, treatment plan and final disposition.     ED Course as of Nov 10 0033   Mon Nov 09, 2020   1911 EKG  seen by ER MD prior to my interpretation        EKG time: 1843  Rhythm/Rate: atrial fibrillation,   P waves and ID: n/a  QRS, axis: normal QRS, normal axis  ST and T waves: probably anteroseptal defect, old, borderline repolarization abnormality diffuse    Interpreted Contemporaneously by me, independently viewed  No prior available for comparison      [EW]   1940 HR , still irregular.  Pt with no complaints.     [EW]   2029 I viewed CXR on PACS and interpreted as mild cardiomegaly.  No focal infiltrates.     [EW]   2055 I discussed patient with Dr. Carty, [facility].  I discussed presentation, improvement of atrial fib rate and patient's history.  He agrees that the patient can go home I will start oral beta-blocker tonight in the emergency department as well as Eliquis and will discharge him on beta-blocker twice a day and he will call in the morning for an appointment this week with [facility].  I have updated the patient and his wife about this.    [EW]      ED Course User Index  [EW] Mary Sales, APRN     DDX: new onset atrial fib with RVR, tachycardia, electrolyte abnormality    MDM: The patient's atrial for with RVR was new today and he was able to tell me the definite time at which it started.  I  "do believe patient has had this in the past however it is never sustained.  His rate was improved down into the 80s to 90s with 3 doses of IV metoprolol.  He was given oral dose of metoprolol and Eliquis prior to discharge.  I did discuss this patient with Dr. Stephon Carty who felt patient could be discharged and he will see him in the office this week.  Patient was never hypoxic and denied any chest pain or shortness of breath complaints     Reviewed pt's history and workup with Dr. Gil.  After a bedside evaluation, Dr. Gil agrees with the plan of care.    The patient's history, physical exam, and lab findings were discussed with the physician, who also performed a face to face history and physical exam.  I discussed all results and noted any abnormalities with patient.  Discussed absoute need to recheck abnormalities with their family physician.  I answered any of the patient's questions.  Discussed plan for discharge, as there is no emergent indication for admission.  Pt is agreeable and understands need for follow up and repeat testing.  Pt is aware that discharge does not mean that nothing is wrong but it indicates no emergency is present and they must continue care with their family physician.  Pt is discharged with instructions to follow up with primary care doctor to have their blood pressure rechecked.           Disposition vitals:  /95   Pulse 92   Temp 97.9 °F (36.6 °C)   Resp 18   Ht 172.7 cm (68\")   Wt 120 kg (265 lb)   SpO2 95%   BMI 40.29 kg/m²       DIAGNOSIS  Final diagnoses:   New onset atrial fibrillation (CMS/HCC)       FOLLOW UP   Stephon Carty III, MD  [address] [id number] [phone]    Schedule an appointment as soon as possible for a visit   to be seen this week         Mary Sales, APRN  11/10/20 0034    " none

## 2025-06-02 ENCOUNTER — HOSPITAL ENCOUNTER (OUTPATIENT)
Dept: CARDIOLOGY | Facility: HOSPITAL | Age: 65
Discharge: HOME | End: 2025-06-02
Admitting: INTERNAL MEDICINE
Payer: COMMERCIAL

## 2025-06-02 ENCOUNTER — OFFICE VISIT (OUTPATIENT)
Dept: CARDIOLOGY | Age: 65
End: 2025-06-02
Payer: COMMERCIAL

## 2025-06-02 VITALS
DIASTOLIC BLOOD PRESSURE: 78 MMHG | SYSTOLIC BLOOD PRESSURE: 134 MMHG | HEIGHT: 67 IN | HEART RATE: 64 BPM | BODY MASS INDEX: 43.32 KG/M2 | WEIGHT: 276 LBS

## 2025-06-02 DIAGNOSIS — R79.89 ELEVATED LFTS: ICD-10-CM

## 2025-06-02 DIAGNOSIS — R07.89 CHEST DISCOMFORT: ICD-10-CM

## 2025-06-02 DIAGNOSIS — G47.33 OSA (OBSTRUCTIVE SLEEP APNEA): ICD-10-CM

## 2025-06-02 DIAGNOSIS — R60.9 EDEMA, UNSPECIFIED TYPE: ICD-10-CM

## 2025-06-02 DIAGNOSIS — I48.0 PAROXYSMAL ATRIAL FIBRILLATION: ICD-10-CM

## 2025-06-02 DIAGNOSIS — I48.19 ATRIAL FIBRILLATION, PERSISTENT: Primary | ICD-10-CM

## 2025-06-02 DIAGNOSIS — I10 ESSENTIAL HYPERTENSION: ICD-10-CM

## 2025-06-02 LAB
ALBUMIN SERPL-MCNC: 3.7 G/DL (ref 3.5–5.2)
ALP SERPL-CCNC: 87 U/L (ref 39–117)
ALT SERPL W P-5'-P-CCNC: 34 U/L (ref 1–41)
AST SERPL-CCNC: 24 U/L (ref 1–40)
BILIRUB CONJ SERPL-MCNC: 0.3 MG/DL (ref 0–0.3)
BILIRUB INDIRECT SERPL-MCNC: 0.4 MG/DL
BILIRUB SERPL-MCNC: 0.7 MG/DL (ref 0–1.2)
PROT SERPL-MCNC: 6.1 G/DL (ref 6–8.5)

## 2025-06-02 PROCEDURE — 80076 HEPATIC FUNCTION PANEL: CPT | Performed by: INTERNAL MEDICINE

## 2025-06-02 PROCEDURE — 36415 COLL VENOUS BLD VENIPUNCTURE: CPT

## 2025-06-02 PROCEDURE — 93000 ELECTROCARDIOGRAM COMPLETE: CPT | Performed by: INTERNAL MEDICINE

## 2025-06-02 PROCEDURE — 99214 OFFICE O/P EST MOD 30 MIN: CPT | Performed by: INTERNAL MEDICINE

## 2025-06-02 RX ORDER — ALLOPURINOL 100 MG/1
100 TABLET ORAL DAILY
COMMUNITY
Start: 2025-02-13

## 2025-06-02 NOTE — PROGRESS NOTES
Date of Office Visit: 2025  Encounter Provider: Ange Hernandez MD  Place of Service: [facility] CARDIOLOGY  Patient Name: Danyel Robertson  :1960    Chief complaint  Chronic atrial fibrillation clearance for colonoscopy    History of Present Illness  Patient is 65-year-old gentleman history of hypertension, reflux disease, dyslipidemia, obstructive sleep apnea (untreated): 2020 was noted to have atrial fibrillation.  He was treated with Lopressor and Eliquis.  Echo showed an ejection fraction 64%, mildly dilated left atrium no significant valvular heart disease.  On 2020 a stress perfusion study was negative for ischemia.  Echo 2023 with ejection fraction 60%, moderate left ventricular hypertrophy, indeterminate diastolic function with moderate severe left atrial enlargement with normal right-sided pressures.  On 2024 he was seen by Dr. Pearl who felt that after review patient would not be a good candidate for rhythm management given the large atrial size alcohol consumption and untreated sleep apnea.    Since last visit patient has not been using CPAP.  Has had no palpitations dizziness chest pain.  He has chronic dyspnea on exertion that occurs rarely.  Edema has improved using support stockings.  He is not exercising but walks frequently at work.  Patient will need a colonoscopy in the near future.  He notes that he had an epidural in the past several months of back pain stop anticoagulation for 3 days without difficulty.  He had some relief of his pain with this.  Remains limited in his activity due to back and also knee pain.  Of note he has been taking omega-3 with Eliquis for a very long time.  He has not had any increased bleeding.    Past Medical History:   Diagnosis Date    Arthritis     GERD (gastroesophageal reflux disease)     Hypertension     New onset atrial fibrillation 2020    Seasonal allergies      Past Surgical History:   Procedure  Laterality Date    KNEE ARTHROSCOPY W/ MENISCAL REPAIR Left 10/2011    WISDOM TOOTH EXTRACTION       Outpatient Medications Prior to Visit   Medication Sig Dispense Refill    allopurinol (ZYLOPRIM) 100 MG tablet Take 1 tablet by mouth Daily.      amLODIPine-benazepril (LOTREL) 10-20 MG per capsule TAKE 1 CAPSULE BY MOUTH EVERY DAY 90 capsule 1    chlorpheniramine (CHLOR-TRIMETON) 4 MG tablet Take 1 tablet by mouth Every 6 (Six) Hours As Needed for Allergies.      Diclofenac Sodium (VOLTAREN) 1 % gel gel 400 Gram, APPLY 4G TOPICALLY FOUR TIMES DAILY MAX 16G/JOINT/DAY OR 32G TOTAL, 0 Refill(s)      diphenhydrAMINE (BENADRYL) 25 mg capsule Take 1 capsule by mouth Daily.      Eliquis 5 MG tablet tablet TAKE 1 TABLET BY MOUTH TWICE A  tablet 3    famotidine (PEPCID) 40 MG tablet Take 1 tablet by mouth Daily As Needed.      fluticasone (FLONASE) 50 MCG/ACT nasal spray Administer 1 spray into the nostril(s) as directed by provider Daily.      furosemide (LASIX) 20 MG tablet Take 1 tablet by mouth 2 (Two) Times a Day.      GUAIFENESIN PO Take 40 mg by mouth Daily.      hydrocortisone butyrate (LOCOID) 0.1 % ointment ointment Apply  topically to the appropriate area as directed 2 (Two) Times a Day As Needed.      metaxalone (SKELAXIN) 800 MG tablet Take 1 tablet by mouth. Usually takes 1-2 times daily as needed      metoprolol tartrate (LOPRESSOR) 100 MG tablet Take 1 tablet by mouth 2 (Two) Times a Day. Take 75 mg daily as needed for palpitations as long as SBP >110      montelukast (SINGULAIR) 10 MG tablet Take 1 tablet by mouth Daily.      omega-3 acid ethyl esters (LOVAZA) 1 g capsule TAKE 2 CAPSULES BY MOUTH 2 (TWO) TIMES DAILY.      vardenafil (LEVITRA) 20 MG tablet Take 1 tablet by mouth Daily As Needed for Erectile Dysfunction.      metoprolol tartrate (LOPRESSOR) 100 MG tablet Take 1 tablet by mouth 2 (Two) Times a Day. (Patient not taking: Reported on 6/2/2025) 180 tablet 1     No facility-administered  "medications prior to visit.       Allergies as of 06/02/2025 - Reviewed 06/02/2025   Allergen Reaction Noted    Oxycodone-acetaminophen Unknown - Low Severity 06/29/2023    Procaine Itching and Swelling 09/30/2014    Oxycodone Rash 09/30/2014    Penicillins Rash 09/30/2014     Social History     Socioeconomic History    Marital status:    Tobacco Use    Smoking status: Never     Passive exposure: Never    Smokeless tobacco: Never   Vaping Use    Vaping status: Never Used   Substance and Sexual Activity    Alcohol use: Yes     Comment: socialCaffeine use: None    Drug use: Never    Sexual activity: Yes     Partners: Female     Family History   Problem Relation Age of Onset    Heart disease Mother         VSD    Other Father         shy drager syndrome    Diabetes Sister      Review of Systems   Constitutional: Negative for chills, fever, weight gain and weight loss.   Cardiovascular:  Positive for dyspnea on exertion and leg swelling.   Respiratory:  Negative for cough, snoring and wheezing.    Hematologic/Lymphatic: Negative for bleeding problem. Does not bruise/bleed easily.   Skin:  Negative for color change.   Musculoskeletal:  Negative for falls, joint pain and myalgias.   Gastrointestinal:  Negative for melena.   Genitourinary:  Negative for hematuria.   Neurological:  Negative for excessive daytime sleepiness.   Psychiatric/Behavioral:  Negative for depression. The patient is not nervous/anxious.         Objective:     Vitals:    06/02/25 0905   BP: 134/78   Pulse: 64   Weight: 125 kg (276 lb)   Height: 170.2 cm (67\")     Body mass index is 43.23 kg/m².    Vitals reviewed.   Constitutional:       Appearance: Well-developed. Morbidly obese.   Eyes:      General: No scleral icterus.        Right eye: No discharge.      Conjunctiva/sclera: Conjunctivae normal.      Pupils: Pupils are equal, round, and reactive to light.   HENT:      Head: Normocephalic.      Nose: Nose normal.   Neck:      Thyroid: No " thyromegaly.      Vascular: No JVD.   Pulmonary:      Effort: Pulmonary effort is normal. No respiratory distress.      Breath sounds: Normal breath sounds. No wheezing. No rales.   Cardiovascular:      Normal rate. Irregularly irregular rhythm. Normal S1. Normal S2.       Murmurs: There is no murmur.      No gallop.    Pulses:     Carotid: 2+ bilaterally.     Radial: 2+ bilaterally.     Dorsalis pedis: 2+ bilaterally.     Posterior tibial: 2+ bilaterally.  Edema:     Peripheral edema absent.   Abdominal:      General: Bowel sounds are normal. There is no distension.      Palpations: Abdomen is soft.      Tenderness: There is no abdominal tenderness. There is no rebound.   Musculoskeletal: Normal range of motion.         General: No tenderness.      Cervical back: Normal range of motion and neck supple. Skin:     General: Skin is warm and dry.      Findings: No erythema or rash.   Neurological:      Mental Status: Alert and oriented to person, place, and time.   Psychiatric:         Behavior: Behavior normal.         Thought Content: Thought content normal.         Judgment: Judgment normal.       Lab Review:     Blood work on 5/12/2025 with LDL of 95, HDL 47, triglycerides 155, hemoglobin A1c is normal at 5.5, CMP unremarkable except for sodium 135 and ALT increased to 73 (new) last CBC with stable hemoglobin on 11/18/2024      ECG 12 Lead    Date/Time: 6/2/2025 9:20 AM  Performed by: Ange Hernandez MD    Authorized by: Ange Hernandez MD  Comparison: compared with previous ECG   Similar to previous ECG  Rhythm: atrial fibrillation    Clinical impression: abnormal EKG            Diagnosis Plan   1. Atrial fibrillation, persistent  ECG 12 Lead      2. Elevated LFTs  Hepatic Function Panel      3. RACH (obstructive sleep apnea)        4. Essential hypertension        5. Paroxysmal atrial fibrillation        6. Chest discomfort        7. Edema, unspecified type          Plan:       1.  Persistent/paroxysmal atrial  fibrillation.  Remains on metoprolol and Eliquis.  Clinically doing well on the current regimen.  He has been on omega-3's for a long time without overt bleeding.  He will be cautious and observe for this.  For now he will continue with both.  2.  Chronic anticoagulant use, Eliquis.  As above  3.  Hypertension.  Controlled  4.  Edema.  Minimal on exam today.  He will try to keep his legs elevated and will stay on the compression stockings.  5.  Untreated sleep apnea.    6.  Dyslipidemia  7.  Obesity  8.  History of elevated LFT.  Recommendations per Dr. Nelson.  9.  Clearance for upcoming colonoscopy.  He has had epidurals with 3 days without difficulty or Hagan embolic event.  Okay to proceed with colonoscopy holding Eliquis for 2 days as requested.    Time Spent: I spent 35 minutes caring for Danyel on this date of service. This time includes time spent by me in the following activities: preparing for the visit, reviewing tests, obtaining and/or reviewing a separately obtained history, performing a medically appropriate examination and/or evaluation, counseling and educating the patient/family/caregiver, ordering medications, tests, or procedures, documenting information in the medical record, and independently interpreting results and communicating that information with the patient/family/caregiver.   I spent 1 minutes on the separately reported service of ECG. This time is not included in the time used to support the E/M service also reported today.        Your medication list            Accurate as of June 2, 2025 11:59 PM. If you have any questions, ask your nurse or doctor.                CONTINUE taking these medications        Instructions Last Dose Given Next Dose Due   allopurinol 100 MG tablet  Commonly known as: ZYLOPRIM      Take 1 tablet by mouth Daily.       amLODIPine-benazepril 10-20 MG per capsule  Commonly known as: LOTREL      TAKE 1 CAPSULE BY MOUTH EVERY DAY       chlorpheniramine 4 MG  tablet  Commonly known as: CHLOR-TRIMETON      Take 1 tablet by mouth Every 6 (Six) Hours As Needed for Allergies.       Diclofenac Sodium 1 % gel gel  Commonly known as: VOLTAREN      400 Gram, APPLY 4G TOPICALLY FOUR TIMES DAILY MAX 16G/JOINT/DAY OR 32G TOTAL, 0 Refill(s)       diphenhydrAMINE 25 mg capsule  Commonly known as: BENADRYL      Take 1 capsule by mouth Daily.       Eliquis 5 MG tablet tablet  Generic drug: apixaban      TAKE 1 TABLET BY MOUTH TWICE A DAY       famotidine 40 MG tablet  Commonly known as: PEPCID      Take 1 tablet by mouth Daily As Needed.       fluticasone 50 MCG/ACT nasal spray  Commonly known as: FLONASE      Administer 1 spray into the nostril(s) as directed by provider Daily.       furosemide 20 MG tablet  Commonly known as: LASIX      Take 1 tablet by mouth 2 (Two) Times a Day.       GUAIFENESIN PO      Take 40 mg by mouth Daily.       hydrocortisone butyrate 0.1 % ointment ointment  Commonly known as: LOCOID      Apply  topically to the appropriate area as directed 2 (Two) Times a Day As Needed.       metaxalone 800 MG tablet  Commonly known as: SKELAXIN      Take 1 tablet by mouth. Usually takes 1-2 times daily as needed       metoprolol tartrate 100 MG tablet  Commonly known as: LOPRESSOR      Take 1 tablet by mouth 2 (Two) Times a Day. Take 75 mg daily as needed for palpitations as long as SBP >110       montelukast 10 MG tablet  Commonly known as: SINGULAIR      Take 1 tablet by mouth Daily.       omega-3 acid ethyl esters 1 g capsule  Commonly known as: LOVAZA      TAKE 2 CAPSULES BY MOUTH 2 (TWO) TIMES DAILY.       vardenafil 20 MG tablet  Commonly known as: LEVITRA      Take 1 tablet by mouth Daily As Needed for Erectile Dysfunction.                Patient is no longer taking -.  I corrected the med list to reflect this.  I did not stop these medications.      Dictated utilizing Dragon dictation

## 2025-06-03 ENCOUNTER — RESULTS FOLLOW-UP (OUTPATIENT)
Dept: CARDIOLOGY | Age: 65
End: 2025-06-03
Payer: COMMERCIAL